# Patient Record
Sex: FEMALE | Race: WHITE | NOT HISPANIC OR LATINO | Employment: FULL TIME | URBAN - METROPOLITAN AREA
[De-identification: names, ages, dates, MRNs, and addresses within clinical notes are randomized per-mention and may not be internally consistent; named-entity substitution may affect disease eponyms.]

---

## 2017-06-13 DIAGNOSIS — Z30.011 ENCOUNTER FOR INITIAL PRESCRIPTION OF CONTRACEPTIVE PILLS: ICD-10-CM

## 2017-06-13 RX ORDER — LEVONORGESTREL AND ETHINYL ESTRADIOL 0.1-0.02MG
KIT ORAL
Qty: 84 TABLET | Refills: 0 | Status: SHIPPED | OUTPATIENT
Start: 2017-06-13 | End: 2017-09-08

## 2017-06-13 NOTE — TELEPHONE ENCOUNTER
levonorgestrel-ethinyl estradiol (AVIANE,ROYCE,LESSINA) 0.1-20 MG-MCG per tablet      Last Written Prescription Date: 6/23/16  Last Fill Quantity: 84, # refills: 3  Last Office Visit with [facility], [facility] or [facility] prescribing provider: 8/25/16       BP Readings from Last 3 Encounters:   08/25/16 121/76   06/23/16 116/76   07/14/15 94/64     Date of last Breast Exam:

## 2017-06-13 NOTE — TELEPHONE ENCOUNTER
Prescription approved per [facility] Refill Protocol.  Marielle García RN  [facility]

## 2017-09-08 DIAGNOSIS — Z30.011 ENCOUNTER FOR INITIAL PRESCRIPTION OF CONTRACEPTIVE PILLS: ICD-10-CM

## 2017-09-08 RX ORDER — LEVONORGESTREL AND ETHINYL ESTRADIOL 0.1-0.02MG
KIT ORAL
Qty: 84 TABLET | Refills: 0 | Status: SHIPPED | OUTPATIENT
Start: 2017-09-08 | End: 2017-12-03

## 2017-09-08 NOTE — TELEPHONE ENCOUNTER
Routing refill request to provider for review/approval because:  Patient needs to be seen because it has been more than 1 year since last office visit. Please advise. Lexi Shah RN-BSN

## 2017-09-08 NOTE — TELEPHONE ENCOUNTER
LUTERA 0.1-20 MG-MCG per tablet      Last Written Prescription Date: 6/13/17  Last Fill Quantity: 84, # refills: 0  Last Office Visit with [facility], [facility] or [facility] prescribing provider: 8/25/16       BP Readings from Last 3 Encounters:   08/25/16 121/76   06/23/16 116/76   07/14/15 94/64     Date of last Breast Exam:

## 2017-12-03 ENCOUNTER — TELEPHONE (OUTPATIENT)
Dept: FAMILY MEDICINE | Facility: CLINIC | Age: 27
End: 2017-12-03

## 2017-12-03 DIAGNOSIS — Z30.011 ENCOUNTER FOR INITIAL PRESCRIPTION OF CONTRACEPTIVE PILLS: ICD-10-CM

## 2017-12-05 RX ORDER — LEVONORGESTREL AND ETHINYL ESTRADIOL 0.1-0.02MG
KIT ORAL
Qty: 28 TABLET | Refills: 0 | Status: SHIPPED | OUTPATIENT
Start: 2017-12-05 | End: 2017-12-15

## 2017-12-05 NOTE — TELEPHONE ENCOUNTER
Message left on home number for patient to call back TC line.  NTBS for yearly check up with PCP.    Kalpana SKELTON

## 2017-12-05 NOTE — TELEPHONE ENCOUNTER
aviane oral contraceptive      Last Written Prescription Date: 9/8/17  Last Fill Quantity: 84,  # refills: 0   Last Office Visit with [facility] or [facility] prescribing provider: 8/25/16    Requested Prescriptions   Pending Prescriptions Disp Refills     AVIANE 0.1-20 MG-MCG per tablet [Pharmacy Med Name: AVIANE-28 TABLETS 28] 84 tablet 0     Sig: TAKE 1 TABLET BY MOUTH EVERY DAY    Contraceptives Protocol Failed    12/3/2017  9:54 AM       Failed - Recent or future visit with authorizing provider's specialty    Patient had office visit in the last year or has a visit in the next 30 days with authorizing provider.  See chart review.              Passed - Patient is not a current smoker if age is 35 or older       Passed - No active pregnancy on record       Passed - No positive pregnancy test in past 12 months        Medication is being filled for 1 time refill only due to:  Patient needs to be seen because it has been more than one year since last visit.     Encounter routed to team to reach out to patient to schedule.  Marielle García RN  [facility]

## 2017-12-06 ENCOUNTER — TELEPHONE (OUTPATIENT)
Dept: FAMILY MEDICINE | Facility: CLINIC | Age: 27
End: 2017-12-06

## 2017-12-06 NOTE — TELEPHONE ENCOUNTER
Agree with nursing assessment. Do not take extra pills and use back up throughout the month.   Sherry Grey PA-C

## 2017-12-06 NOTE — TELEPHONE ENCOUNTER
Routed to provider to advise, assume she would not take any extra pills but does she need to use alternate contraception for a month or anything due to disruption of schedule?    Marielle García RN  [facility]

## 2017-12-06 NOTE — TELEPHONE ENCOUNTER
Reason for Call:  Other call back    Detailed comments: Patient states that she was supposed to start her new pack of birth control on Sunday, but she had run out of refills so she didn't get it until Tuesday. Patient states that she took one pill yesterday evening when she got the medication, but she is looking for guidance if this was the right thing to do, or if she needs to take the pills she missed.    Phone Number Patient can be reached at: Home number on file [phone] (home)    Best Time: anytime    Can we leave a detailed message on this number? YES    Call taken on 12/6/2017 at 10:20 AM by David Lopez

## 2017-12-06 NOTE — TELEPHONE ENCOUNTER
Attempt # 1  Called patient at home number.  Was call answered?  Yes, relayed below message from provider to patient - patient had no further questions.    Mirela Brown RN  [facility]

## 2017-12-15 ENCOUNTER — OFFICE VISIT (OUTPATIENT)
Dept: FAMILY MEDICINE | Facility: CLINIC | Age: 27
End: 2017-12-15
Payer: COMMERCIAL

## 2017-12-15 VITALS
WEIGHT: 125.4 LBS | SYSTOLIC BLOOD PRESSURE: 117 MMHG | TEMPERATURE: 98.2 F | HEART RATE: 78 BPM | DIASTOLIC BLOOD PRESSURE: 83 MMHG | BODY MASS INDEX: 23.3 KG/M2

## 2017-12-15 DIAGNOSIS — Z30.011 ENCOUNTER FOR INITIAL PRESCRIPTION OF CONTRACEPTIVE PILLS: ICD-10-CM

## 2017-12-15 DIAGNOSIS — B00.1 HERPES LABIALIS: ICD-10-CM

## 2017-12-15 DIAGNOSIS — Z00.00 ROUTINE GENERAL MEDICAL EXAMINATION AT A HEALTH CARE FACILITY: Primary | ICD-10-CM

## 2017-12-15 DIAGNOSIS — Z23 NEED FOR PROPHYLACTIC VACCINATION AND INOCULATION AGAINST INFLUENZA: ICD-10-CM

## 2017-12-15 PROCEDURE — 99395 PREV VISIT EST AGE 18-39: CPT | Mod: 25 | Performed by: PHYSICIAN ASSISTANT

## 2017-12-15 PROCEDURE — 90686 IIV4 VACC NO PRSV 0.5 ML IM: CPT | Performed by: PHYSICIAN ASSISTANT

## 2017-12-15 PROCEDURE — 90471 IMMUNIZATION ADMIN: CPT | Performed by: PHYSICIAN ASSISTANT

## 2017-12-15 RX ORDER — LEVONORGESTREL/ETHIN.ESTRADIOL 0.1-0.02MG
1 TABLET ORAL DAILY
Qty: 84 TABLET | Refills: 3 | Status: SHIPPED | OUTPATIENT
Start: 2017-12-15 | End: 2018-05-01

## 2017-12-15 RX ORDER — VALACYCLOVIR HYDROCHLORIDE 1 G/1
2000 TABLET, FILM COATED ORAL 2 TIMES DAILY
Qty: 20 TABLET | Refills: 1 | Status: SHIPPED | OUTPATIENT
Start: 2017-12-15 | End: 2018-12-27

## 2017-12-15 NOTE — NURSING NOTE
"Chief Complaint   Patient presents with     Physical     Health Maintenance     Eye Exam and Influenza        Initial /83 (BP Location: Right arm, Patient Position: Chair, Cuff Size: Adult Regular)  Pulse 78  Temp 98.2  F (36.8  C) (Oral)  Wt 125 lb 6.4 oz (56.9 kg)  LMP 11/13/2017 (Approximate)  Breastfeeding? No  BMI 23.3 kg/m2 Estimated body mass index is 23.3 kg/(m^2) as calculated from the following:    Height as of 6/23/16: 5' 1.52\" (1.563 m).    Weight as of this encounter: 125 lb 6.4 oz (56.9 kg).  Medication Reconciliation: complete     DONNA Hooper MA      "

## 2017-12-15 NOTE — PROGRESS NOTES
SUBJECTIVE:   CC: Emily Carrillo is an 27 year old woman who presents for preventive health visit.     Physical   Annual:     Getting at least 3 servings of Calcium per day::  Yes    Bi-annual eye exam::  Yes    Dental care twice a year::  Yes    Sleep apnea or symptoms of sleep apnea::  None    Diet::  Regular (no restrictions)    Frequency of exercise::  4-5 days/week    Duration of exercise::  45-60 minutes    Taking medications regularly::  Yes    Additional concerns today::  No          Has annual eye exam.   Was off of OCP for three days while waiting for refill, but used backup form of protection.   LMP last month.     Gets cold sores often, would like a refill for valtrex.    Today's PHQ-2 Score:   PHQ-2 ( 1999 Pf[employer]) 12/15/2017   Q1: Little interest or pleasure in doing things 0   Q2: Feeling down, depressed or hopeless 0   PHQ-2 Score 0   Q1: Little interest or pleasure in doing things Not at all   Q2: Feeling down, depressed or hopeless Not at all   PHQ-2 Score 0       Abuse: Current or Past(Physical, Sexual or Emotional)- No  Do you feel safe in your environment - Yes    Social History   Substance Use Topics     Smoking status: Never Smoker     Smokeless tobacco: Never Used     Alcohol use Yes      Comment: 2 times  a month      Alcohol Use 12/15/2017   If you drink alcohol, do you typically have greater than 3 drinks per day OR greater than 7 drinks per week?   No     Alcohol use one time a week.   Reviewed orders with patient.  Reviewed health maintenance and updated orders accordingly - Yes    Mammogram not appropriate for this patient based on age.    Pertinent mammograms are reviewed under the imaging tab.  History of abnormal Pap smear: NO - age 21-29 PAP every 3 years recommended    Reviewed and updated as needed this visit by clinical staff  Tobacco  Allergies  Meds  Med Hx  Surg Hx  Fam Hx  Soc Hx      Reviewed and updated as needed this visit by Provider       Review of Systems  C:  NEGATIVE for fever, chills, change in weight  I: NEGATIVE for worrisome rashes, moles or lesions  E: NEGATIVE for vision changes or irritation  ENT: NEGATIVE for ear, mouth and throat problems  R: NEGATIVE for significant cough or SOB  B: NEGATIVE for masses, tenderness or discharge  CV: NEGATIVE for chest pain, palpitations or peripheral edema  GI: NEGATIVE for nausea, abdominal pain, heartburn, or change in bowel habits  : NEGATIVE for unusual urinary or vaginal symptoms. Periods are regular.  M: NEGATIVE for significant arthralgias or myalgia  N: NEGATIVE for weakness, dizziness or paresthesias  P: NEGATIVE for changes in mood or affect     OBJECTIVE:   /83 (BP Location: Right arm, Patient Position: Chair, Cuff Size: Adult Regular)  Pulse 78  Temp 98.2  F (36.8  C) (Oral)  Wt 125 lb 6.4 oz (56.9 kg)  LMP 11/13/2017 (Approximate)  Breastfeeding? No  BMI 23.3 kg/m2  Physical Exam  GENERAL: healthy, alert and no distress  EYES: Eyes grossly normal to inspection, PERRL and conjunctivae and sclerae normal  HENT: ear canals and TM's normal, nose and mouth without ulcers or lesions  NECK: no adenopathy, no asymmetry, masses, or scars and thyroid normal to palpation  RESP: lungs clear to auscultation - no rales, rhonchi or wheezes  BREAST: normal without masses, tenderness or nipple discharge and no palpable axillary masses or adenopathy  CV: regular rate and rhythm, normal S1 S2, no S3 or S4, no murmur, click or rub, no peripheral edema and peripheral pulses strong  ABDOMEN: soft, nontender, no hepatosplenomegaly, no masses and bowel sounds normal  MS: no gross musculoskeletal defects noted, no edema  SKIN: no suspicious lesions or rashes  NEURO: Normal strength and tone, mentation intact and speech normal  PSYCH: mentation appears normal, affect normal/bright    ASSESSMENT/PLAN:       ICD-10-CM    1. Routine general medical examination at a health care facility Z00.00    2. Need for prophylactic  "vaccination and inoculation against influenza Z23 FLU VAC, SPLIT VIRUS IM > 3 YO (QUADRIVALENT) [06396]     Vaccine Administration, Initial [11965]   3. Encounter for initial prescription of contraceptive pills Z30.011 levonorgestrel-ethinyl estradiol (AVIANE) 0.1-20 MG-MCG per tablet   4. Herpes labialis B00.1 valACYclovir (VALTREX) 1000 mg tablet   Emily is a healthy, well appearing female. Discussed possible migraine typehead aches with auras; told patient she can try Excedrin-Migraine as needed for headaches when severe. OCP working well and would like to continue it. Uses Valtrex as needed for herpes labialis, and it works well to decrease her symptoms.     COUNSELING:  Reviewed preventive health counseling, as reflected in patient instructions       Immunizations    Vaccinated for: Influenza           Contraception     reports that she has never smoked. She has never used smokeless tobacco.    Estimated body mass index is 23.3 kg/(m^2) as calculated from the following:    Height as of 6/23/16: 5' 1.52\" (1.563 m).    Weight as of this encounter: 125 lb 6.4 oz (56.9 kg).     Counseling Resources:  ATP IV Guidelines  Pooled Cohorts Equation Calculator  Breast Cancer Risk Calculator  FRAX Risk Assessment  ICSI Preventive Guidelines  Dietary Guidelines for Americans, 2010  USDA's MyPlate  ASA Prophylaxis  Lung CA Screening    TAMIKO Pyle PA-C  [facility] for HPI/ROS submitted by the patient on 12/15/2017   PHQ-2 Score: 0    Injectable Influenza Immunization Documentation    1.  Is the person to be vaccinated sick today?   No    2. Does the person to be vaccinated have an allergy to a component   of the vaccine?   No  Egg Allergy Algorithm Link    3. Has the person to be vaccinated ever had a serious reaction   to influenza vaccine in the past?   No    4. Has the person to be vaccinated ever had Guillain-Barré syndrome?   No    Form completed by DONNA Hooper, " MA

## 2017-12-15 NOTE — NURSING NOTE
VIS for Influenza given on same date of administration.  Staff signature/Title: DONNA Hooper MA    Prior to injection verified patient identity using patient's name and date of birth.  DONNA Hooper MA    Per orders of Sherry Grey PA-C, injection of Influenza given by Ary Hooper CMA. Patient instructed to remain in clinic for 15 minutes afterwards, and to report any adverse reaction to me immediately.

## 2017-12-15 NOTE — MR AVS SNAPSHOT
After Visit Summary   12/15/2017    Emily Carrillo    MRN: [id number]           Patient Information     Date Of Birth          1990        Visit Information        Provider Department      12/15/2017 10:20 AM Sherry Grey PA-C [facility]        Today's Diagnoses     Routine general medical examination at a health care facility    -  1    Need for prophylactic vaccination and inoculation against influenza        Encounter for initial prescription of contraceptive pills        Herpes labialis          Care Instructions      Preventive Health Recommendations  Female Ages 26 - 39  Yearly exam:   See your health care provider every year in order to    Review health changes.     Discuss preventive care.      Review your medicines if you your doctor has prescribed any.    Until age 30: Get a Pap test every three years (more often if you have had an abnormal result).    After age 30: Talk to your doctor about whether you should have a Pap test every 3 years or have a Pap test with HPV screening every 5 years.   You do not need a Pap test if your uterus was removed (hysterectomy) and you have not had cancer.  You should be tested each year for STDs (sexually transmitted diseases), if you're at risk.   Talk to your provider about how often to have your cholesterol checked.  If you are at risk for diabetes, you should have a diabetes test (fasting glucose).  Shots: Get a flu shot each year. Get a tetanus shot every 10 years.   Nutrition:     Eat at least 5 servings of fruits and vegetables each day.    Eat whole-grain bread, whole-wheat pasta and brown rice instead of white grains and rice.    Talk to your provider about Calcium and Vitamin D.     Lifestyle    Exercise at least 150 minutes a week (30 minutes a day, 5 days of the week). This will help you control your weight and prevent disease.    Limit alcohol to one drink per day.    No smoking.     Wear sunscreen to prevent skin  "cancer.    See your dentist every six months for an exam and cleaning.            Follow-ups after your visit        Who to contact     If you have questions or need follow up information about today's clinic visit or your schedule please contact [facility] directly at [phone].  Normal or non-critical lab and imaging results will be communicated to you by MyChart, letter or phone within 4 business days after the clinic has received the results. If you do not hear from us within 7 days, please contact the clinic through MyChart or phone. If you have a critical or abnormal lab result, we will notify you by phone as soon as possible.  Submit refill requests through [employer] or call your pharmacy and they will forward the refill request to us. Please allow 3 business days for your refill to be completed.          Additional Information About Your Visit        MyChart Information     [employer] lets you send messages to your doctor, view your test results, renew your prescriptions, schedule appointments and more. To sign up, go to [email or web address] . Click on \"Log in\" on the left side of the screen, which will take you to the Welcome page. Then click on \"Sign up Now\" on the right side of the page.     You will be asked to enter the access code listed below, as well as some personal information. Please follow the directions to create your username and password.     Your access code is: [email or web address]  Expires: 3/15/2018 10:53 AM     Your access code will  in 90 days. If you need help or a new code, please call your [facility] or [phone].        Care EveryWhere ID     This is your Care EveryWhere ID. This could be used by other organizations to access your [facility] medical records  [id number]        Your Vitals Were     Pulse Temperature Last Period Breastfeeding? BMI (Body Mass Index)       78 98.2  F (36.8  C) (Oral) 2017 (Approximate) No 23.3 kg/m2        Blood " Pressure from Last 3 Encounters:   12/15/17 117/83   08/25/16 121/76   06/23/16 116/76    Weight from Last 3 Encounters:   12/15/17 125 lb 6.4 oz (56.9 kg)   08/25/16 116 lb (52.6 kg)   06/23/16 117 lb (53.1 kg)              We Performed the Following     FLU VAC, SPLIT VIRUS IM > 3 YO (QUADRIVALENT) [39009]     Vaccine Administration, Initial [10782]          Today's Medication Changes          These changes are accurate as of: 12/15/17 10:53 AM.  If you have any questions, ask your nurse or doctor.               These medicines have changed or have updated prescriptions.        Dose/Directions    levonorgestrel-ethinyl estradiol 0.1-20 MG-MCG per tablet   Commonly known as:  AVIANE   This may have changed:  See the new instructions.   Used for:  Encounter for initial prescription of contraceptive pills   Changed by:  Sherry rGey PA-C        Dose:  1 tablet   Take 1 tablet by mouth daily   Quantity:  84 tablet   Refills:  3            Where to get your medicines      These medications were sent to [facility] [address] [place] [id number]     Phone:  [phone]     levonorgestrel-ethinyl estradiol 0.1-20 MG-MCG per tablet    valACYclovir 1000 mg tablet                Primary Care Provider Office Phone # Fax #    Sherry Grey PA-C [phone]       [address] [facility] [address]        Equal Access to Services     KAJAL HAGEN : Hadii aad ku hadasho Sodaniloali, waaxda luqadaha, qaybta kaalmada adeegyada, farhad salvador. So [facility] [phone].    ATENCIÓN: Si habla español, tiene a robertson disposición servicios gratuitos de asistencia lingüística. Kayla rodriguez [phone].    We comply with applicable federal civil rights laws and [place] laws. We do not discriminate on the basis of race, color, national origin, age, disability, sex, sexual orientation, or gender identity.             Thank you!     Thank you for choosing [facility]  for your care. Our goal is always to provide you with excellent care. Hearing back from our patients is one way we can continue to improve our services. Please take a few minutes to complete the written survey that you may receive in the mail after your visit with us. Thank you!             Your Updated Medication List - Protect others around you: Learn how to safely use, store and throw away your medicines at www.disposemymeds.org.          This list is accurate as of: 12/15/17 10:53 AM.  Always use your most recent med list.                   Brand Name Dispense Instructions for use Diagnosis    levonorgestrel-ethinyl estradiol 0.1-20 MG-MCG per tablet    AVIANE    84 tablet    Take 1 tablet by mouth daily    Encounter for initial prescription of contraceptive pills       valACYclovir 1000 mg tablet    VALTREX    20 tablet    Take 2 tablets (2,000 mg) by mouth 2 times daily For 1 day.    Herpes labialis

## 2018-05-01 ENCOUNTER — TELEPHONE (OUTPATIENT)
Dept: FAMILY MEDICINE | Facility: CLINIC | Age: 28
End: 2018-05-01

## 2018-05-01 DIAGNOSIS — Z30.011 ENCOUNTER FOR INITIAL PRESCRIPTION OF CONTRACEPTIVE PILLS: ICD-10-CM

## 2018-05-01 RX ORDER — LEVONORGESTREL/ETHIN.ESTRADIOL 0.1-0.02MG
1 TABLET ORAL DAILY
Qty: 84 TABLET | Refills: 2 | Status: SHIPPED | OUTPATIENT
Start: 2018-05-01 | End: 2018-12-19

## 2018-05-01 NOTE — TELEPHONE ENCOUNTER
Reason for Call:  Other prescription    Detailed comments: patient is requesting Rx for Silas/Lutera [facility] to clinic back in March but did not receive a response. Patient will ask pharmacy to resend information.    Phone Number Patient can be reached at: Home number on file [phone] (home)    Best Time: anytime    Can we leave a detailed message on this number? YES    Call taken on 5/1/2018 at 2:33 PM by Sierra Perez

## 2018-05-01 NOTE — TELEPHONE ENCOUNTER
Patient was last seen 12/15/17, annual follow up advised.    Mail order will not call to transfer Rx.    84 tabs with 3 refills was sent to [facility] 12/15/17.    I sent 84 with 2 refills to [facility] now to get her to/through needed follow up.    I called patient at [phone], no answer, left brief message advising Rx was re-sent to requested mail order pharmacy.    Marielle García RN  [facility]

## 2018-07-07 ENCOUNTER — NURSE TRIAGE (OUTPATIENT)
Dept: NURSING | Facility: CLINIC | Age: 28
End: 2018-07-07

## 2018-07-07 NOTE — TELEPHONE ENCOUNTER
"Patient developed sunburn 7/5/18, and this norming her face and around eyes are swollen.  Denies visual problems.  \"About three tiny blisters on nose.   Additional Information    Negative: Difficult to awaken or acting confused  (e.g., disoriented, slurred speech)    Negative: Passed out (i.e., lost consciousness, collapsed and was not responding)    Negative: Sounds like a life-threatening emergency to the triager    Negative: [1] Sunburn - like rash BUT [2] minimal or no sun exposure    Negative: Heat stroke, sunstroke or heat exhaustion suspected    Negative: Too weak to stand    Negative: Fever > 103 F (39.4 C)    Negative: [1] Blisters (2nd degree burn) AND [2] covers > 10% of body surface area    Negative: Patient sounds very sick or weak to the triager    Negative: [1] SEVERE sunburn pain (e.g., excruciating) AND [2] not improved after 2  hours of pain medicine    Negative: [1] SEVERE eye pain or blurred vision AND [2] follows sun exposure (welding or other significant light exposure)    Negative: [1] Fever AND [2] spreading redness or red streak from open area    Negative: [1] Blisters (2nd degree burn) AND [2] covers > 2% of body surface area (i.e., > 2 palms)    Negative: Blisters on the face    Negative: Blister larger than 1 inch (2.5 cm)    Negative: [1] Looks infected  (e.g., pus, red streaks, spreading redness) AND [2] no fever    Negative: [1] Sunburn following brief sun exposure AND [2] taking photosensitizing drug (e.g., tetracycline, doxycycline, elidel, protopic, griseofulvin, sulfa drugs)    Negative: Recurring episodes of itchy rash in sun-exposed areas    Mild sunburn (all triage questions negative)    Protocols used: SUNBURN-ADULT-      "

## 2018-12-19 DIAGNOSIS — Z30.011 ENCOUNTER FOR INITIAL PRESCRIPTION OF CONTRACEPTIVE PILLS: ICD-10-CM

## 2018-12-19 RX ORDER — LEVONORGESTREL/ETHIN.ESTRADIOL 0.1-0.02MG
TABLET ORAL
Qty: 84 TABLET | Refills: 0 | Status: SHIPPED | OUTPATIENT
Start: 2018-12-19 | End: 2018-12-31

## 2018-12-19 NOTE — TELEPHONE ENCOUNTER
Medication is being filled for 1 time refill only due to:  Patient needs to be seen because it has been more than one year since last visit.   Merary Meier RN

## 2018-12-19 NOTE — TELEPHONE ENCOUNTER
"Requested Prescriptions   Pending Prescriptions Disp Refills     levonorgestrel-ethinyl estradiol (AVIANE/ALESSE/LESSINA) 0.1-20 MG-MCG tablet [Pharmacy Med Name: L-RICHAR/ETH ES TABS 28'S 0.1/20] 84 tablet 2    Last Written Prescription Date:  5-1-18  Last Fill Quantity: 84,  # refills: 2   Last office visit: 12/15/2017 with prescribing provider:  12-15-17   Future Office Visit:     Sig: TAKE 1 TABLET DAILY    Contraceptives Protocol Failed - 12/19/2018  1:33 AM       Failed - Recent (12 mo) or future (30 days) visit within the authorizing provider's specialty    Patient had office visit in the last 12 months or has a visit in the next 30 days with authorizing provider or within the authorizing provider's specialty.  See \"Patient Info\" tab in inbasket, or \"Choose Columns\" in Meds & Orders section of the refill encounter.             Passed - Patient is not a current smoker if age is 35 or older       Passed - No active pregnancy on record       Passed - No positive pregnancy test in past 12 months          "

## 2018-12-26 DIAGNOSIS — B00.1 HERPES LABIALIS: ICD-10-CM

## 2018-12-26 NOTE — TELEPHONE ENCOUNTER
Attempt # 1  Called patient at home number.  Was call answered?  Yes, patient looked at bottle and spelled out valACYclovir for nurse.    Cued up.    Requested Prescriptions   Pending Prescriptions Disp Refills     valACYclovir (VALTREX) 1000 mg tablet 20 tablet 1     Sig: Take 2 tablets (2,000 mg) by mouth 2 times daily For 1 day.    There is no refill protocol information for this order        Last Written Prescription Date:  12/15/2017  Last Fill Quantity: 20,  # refills: 1   Last office visit: 12/15/2017 with prescribing provider:  el Grey Office Visit:  12/31/2018 8:40 am  Routing refill request to provider for review/approval because:  Drug not on the [facility] refill protocol         Mirela Brown RN  [facility]

## 2018-12-26 NOTE — TELEPHONE ENCOUNTER
Reason for call:  Medication   If this is a refill request, has the caller requested the refill from the pharmacy already? No  Will the patient be using a [facility]? No  Name of the pharmacy and phone number for the current request: [facility] [phone]    Name of the medication requested: patient does not know the name but prescription is for cold sore    Other request: no    Phone number to reach patient:  Home number on file [phone] (home)    Best Time:  Anytime     Can we leave a detailed message on this number?  YES

## 2018-12-27 RX ORDER — VALACYCLOVIR HYDROCHLORIDE 1 G/1
2000 TABLET, FILM COATED ORAL 2 TIMES DAILY
Qty: 20 TABLET | Refills: 1 | Status: ON HOLD | OUTPATIENT
Start: 2018-12-27 | End: 2020-07-18

## 2018-12-31 ENCOUNTER — OFFICE VISIT (OUTPATIENT)
Dept: FAMILY MEDICINE | Facility: CLINIC | Age: 28
End: 2018-12-31
Payer: COMMERCIAL

## 2018-12-31 VITALS
SYSTOLIC BLOOD PRESSURE: 115 MMHG | HEART RATE: 78 BPM | WEIGHT: 124 LBS | TEMPERATURE: 98.1 F | BODY MASS INDEX: 22.82 KG/M2 | DIASTOLIC BLOOD PRESSURE: 79 MMHG | HEIGHT: 62 IN | OXYGEN SATURATION: 97 %

## 2018-12-31 DIAGNOSIS — Z00.00 ROUTINE GENERAL MEDICAL EXAMINATION AT A HEALTH CARE FACILITY: Primary | ICD-10-CM

## 2018-12-31 DIAGNOSIS — Z30.011 ENCOUNTER FOR INITIAL PRESCRIPTION OF CONTRACEPTIVE PILLS: ICD-10-CM

## 2018-12-31 PROCEDURE — 99395 PREV VISIT EST AGE 18-39: CPT | Performed by: PHYSICIAN ASSISTANT

## 2018-12-31 RX ORDER — LEVONORGESTREL/ETHIN.ESTRADIOL 0.1-0.02MG
1 TABLET ORAL DAILY
Qty: 84 TABLET | Refills: 3 | Status: SHIPPED | OUTPATIENT
Start: 2018-12-31 | End: 2019-08-19

## 2018-12-31 ASSESSMENT — ENCOUNTER SYMPTOMS
DIARRHEA: 0
HEARTBURN: 0
EYE PAIN: 0
DIZZINESS: 0
MYALGIAS: 0
WEAKNESS: 0
ABDOMINAL PAIN: 0
HEMATURIA: 0
DYSURIA: 0
NERVOUS/ANXIOUS: 0
SORE THROAT: 0
HEMATOCHEZIA: 0
HEADACHES: 1
PALPITATIONS: 0
ARTHRALGIAS: 0
CONSTIPATION: 0
NAUSEA: 0
FREQUENCY: 0
CHILLS: 0
BREAST MASS: 0
JOINT SWELLING: 0
PARESTHESIAS: 0
SHORTNESS OF BREATH: 0
FEVER: 0
COUGH: 0

## 2018-12-31 ASSESSMENT — MIFFLIN-ST. JEOR: SCORE: 1242.77

## 2018-12-31 NOTE — PROGRESS NOTES
SUBJECTIVE:   CC: Emily Carrillo is an 28 year old woman who presents for preventive health visit.     Physical   Annual:     Getting at least 3 servings of Calcium per day:  Yes    Bi-annual eye exam:  Yes    Dental care twice a year:  Yes    Sleep apnea or symptoms of sleep apnea:  None    Diet:  Regular (no restrictions)    Frequency of exercise:  2-3 days/week    Duration of exercise:  45-60 minutes    Taking medications regularly:  Yes    Medication side effects:  None    Additional concerns today:  No    PHQ-2 Total Score: 0    Has had a headache since Wednesday. Positional, makes the pounding in head. She took 1 ibuprofen 1 time, but nothing since. Persistent, able to fall asleep, but does wake up with the headache. Pain is a 2-3/10, had been a 10/10 on the first day. Has some more congestion, had a cold sore.         Today's PHQ-2 Score:   PHQ-2 ( 1999 Pfizer) 12/31/2018   Q1: Little interest or pleasure in doing things 0   Q2: Feeling down, depressed or hopeless 0   PHQ-2 Score 0   Q1: Little interest or pleasure in doing things Not at all   Q2: Feeling down, depressed or hopeless Not at all   PHQ-2 Score 0       Abuse: Current or Past(Physical, Sexual or Emotional)- No  Do you feel safe in your environment? Yes    Social History     Tobacco Use     Smoking status: Never Smoker     Smokeless tobacco: Never Used   Substance Use Topics     Alcohol use: Yes     Comment: 2 times  a month      Alcohol Use 12/31/2018   If you drink alcohol do you typically have greater than 3 drinks per day OR greater than 7 drinks per week? No       Reviewed orders with patient.  Reviewed health maintenance and updated orders accordingly - Yes  Labs reviewed in EPIC    Mammogram not appropriate for this patient based on age.    Pertinent mammograms are reviewed under the imaging tab.  History of abnormal Pap smear: NO - age 21-29 PAP every 3 years recommended  PAP / HPV 6/23/2016   PAP NIL     Reviewed and updated as needed  "this visit by clinical staff  Tobacco  Allergies  Meds  Problems  Med Hx  Surg Hx  Fam Hx  Soc Hx          Reviewed and updated as needed this visit by Provider  Tobacco  Allergies  Meds  Problems  Med Hx  Surg Hx  Fam Hx            Review of Systems   Constitutional: Negative for chills and fever.   HENT: Negative for congestion, ear pain, hearing loss and sore throat.    Eyes: Negative for pain and visual disturbance.   Respiratory: Negative for cough and shortness of breath.    Cardiovascular: Negative for chest pain, palpitations and peripheral edema.   Gastrointestinal: Negative for abdominal pain, constipation, diarrhea, heartburn, hematochezia and nausea.   Breasts:  Negative for tenderness, breast mass and discharge.   Genitourinary: Negative for dysuria, frequency, genital sores, hematuria, pelvic pain, urgency, vaginal bleeding and vaginal discharge.   Musculoskeletal: Negative for arthralgias, joint swelling and myalgias.   Skin: Negative for rash.   Neurological: Positive for headaches. Negative for dizziness, weakness and paresthesias.   Psychiatric/Behavioral: Negative for mood changes. The patient is not nervous/anxious.         OBJECTIVE:   /79 (BP Location: Left arm, Patient Position: Chair, Cuff Size: Adult Large)   Pulse 78   Temp 98.1  F (36.7  C) (Oral)   Ht 1.562 m (5' 1.5\")   Wt 56.2 kg (124 lb)   LMP 12/26/2018   SpO2 97%   BMI 23.05 kg/m    Physical Exam  GENERAL: healthy, alert and no distress  EYES: Eyes grossly normal to inspection, PERRL and conjunctivae and sclerae normal  HENT: ear canals and TM's normal, nose and mouth without ulcers or lesions  NECK: no adenopathy, no asymmetry, masses, or scars and thyroid normal to palpation  RESP: lungs clear to auscultation - no rales, rhonchi or wheezes  BREAST: normal without masses, tenderness or nipple discharge and no palpable axillary masses or adenopathy  CV: regular rate and rhythm, normal S1 S2, no S3 or S4, no " "murmur, click or rub, no peripheral edema and peripheral pulses strong  ABDOMEN: soft, nontender, no hepatosplenomegaly, no masses and bowel sounds normal  MS: no gross musculoskeletal defects noted, no edema  SKIN: no suspicious lesions or rashes  NEURO: Normal strength and tone, mentation intact and speech normal  PSYCH: mentation appears normal, affect normal/bright    Diagnostic Test Results:  none     ASSESSMENT/PLAN:       ICD-10-CM    1. Routine general medical examination at a health care facility Z00.00    2. Encounter for initial prescription of contraceptive pills Z30.011 levonorgestrel-ethinyl estradiol (AVIANE/ALESSE/LESSINA) 0.1-20 MG-MCG tablet   Patient will return for pap smear in June. Refilled OCP.   return to clinic prn.     COUNSELING:  Reviewed preventive health counseling, as reflected in patient instructions       Contraception       Family planning       Safe sex practices/STD prevention    BP Readings from Last 1 Encounters:   12/31/18 115/79     Estimated body mass index is 23.05 kg/m  as calculated from the following:    Height as of this encounter: 1.562 m (5' 1.5\").    Weight as of this encounter: 56.2 kg (124 lb).           reports that  has never smoked. she has never used smokeless tobacco.      Counseling Resources:  ATP IV Guidelines  Pooled Cohorts Equation Calculator  Breast Cancer Risk Calculator  FRAX Risk Assessment  ICSI Preventive Guidelines  Dietary Guidelines for Americans, 2010  USDA's MyPlate  ASA Prophylaxis  Lung CA Screening    Sherry Grey PA-C  [facility]  "

## 2018-12-31 NOTE — PATIENT INSTRUCTIONS
Take 1000mg of Tylenol before bed tonight  Use Afrin for no longer than 3 days.   If headache is not improving let me know.     Preventive Health Recommendations  Female Ages 26 - 39  Yearly exam:   See your health care provider every year in order to    Review health changes.     Discuss preventive care.      Review your medicines if you your doctor has prescribed any.    Until age 30: Get a Pap test every three years (more often if you have had an abnormal result).    After age 30: Talk to your doctor about whether you should have a Pap test every 3 years or have a Pap test with HPV screening every 5 years.   You do not need a Pap test if your uterus was removed (hysterectomy) and you have not had cancer.  You should be tested each year for STDs (sexually transmitted diseases), if you're at risk.   Talk to your provider about how often to have your cholesterol checked.  If you are at risk for diabetes, you should have a diabetes test (fasting glucose).  Shots: Get a flu shot each year. Get a tetanus shot every 10 years.   Nutrition:     Eat at least 5 servings of fruits and vegetables each day.    Eat whole-grain bread, whole-wheat pasta and brown rice instead of white grains and rice.    Get adequate Calcium and Vitamin D.     Lifestyle    Exercise at least 150 minutes a week (30 minutes a day, 5 days of the week). This will help you control your weight and prevent disease.    Limit alcohol to one drink per day.    No smoking.     Wear sunscreen to prevent skin cancer.    See your dentist every six months for an exam and cleaning.

## 2019-03-13 DIAGNOSIS — Z30.011 ENCOUNTER FOR INITIAL PRESCRIPTION OF CONTRACEPTIVE PILLS: ICD-10-CM

## 2019-03-13 NOTE — TELEPHONE ENCOUNTER
"Requested Prescriptions   Pending Prescriptions Disp Refills     levonorgestrel-ethinyl estradiol (AVIANE/ALESSE/LESSINA) 0.1-20 MG-MCG tablet [Pharmacy Med Name: L-RICHAR/ETH ES TABS 28'S 0.1/20] 84 tablet 0    Last Written Prescription Date:  12-31-18  Last Fill Quantity: 84,  # refills: 3   Last office visit: 12/31/2018 with prescribing provider:  12-31-18   Future Office Visit:     Sig: TAKE 1 TABLET DAILY (DUE FOR OFFICE VISIT AND/OR LABS BEFORE FURTHER REFILLS)    Contraceptives Protocol Passed - 3/13/2019 12:54 AM       Passed - Patient is not a current smoker if age is 35 or older       Passed - Recent (12 mo) or future (30 days) visit within the authorizing provider's specialty    Patient had office visit in the last 12 months or has a visit in the next 30 days with authorizing provider or within the authorizing provider's specialty.  See \"Patient Info\" tab in inbasket, or \"Choose Columns\" in Meds & Orders section of the refill encounter.             Passed - Medication is active on med list       Passed - No active pregnancy on record       Passed - No positive pregnancy test in past 12 months          "

## 2019-03-14 RX ORDER — LEVONORGESTREL/ETHIN.ESTRADIOL 0.1-0.02MG
TABLET ORAL
Qty: 84 TABLET | Refills: 0 | Status: SHIPPED | OUTPATIENT
Start: 2019-03-14 | End: 2019-06-06

## 2019-03-14 NOTE — TELEPHONE ENCOUNTER
Prescription approved per [facility] Refill Protocol.  Janeen Landeros RN

## 2019-06-06 DIAGNOSIS — Z30.011 ENCOUNTER FOR INITIAL PRESCRIPTION OF CONTRACEPTIVE PILLS: ICD-10-CM

## 2019-06-07 RX ORDER — LEVONORGESTREL/ETHIN.ESTRADIOL 0.1-0.02MG
TABLET ORAL
Qty: 84 TABLET | Refills: 2 | Status: SHIPPED | OUTPATIENT
Start: 2019-06-07 | End: 2019-08-19

## 2019-06-07 NOTE — TELEPHONE ENCOUNTER
Prescription approved per [facility] Refill Protocol.  Merary Meier RN

## 2019-06-07 NOTE — TELEPHONE ENCOUNTER
"Requested Prescriptions   Pending Prescriptions Disp Refills     levonorgestrel-ethinyl estradiol (AVIANE/ALESSE/LESSINA) 0.1-20 MG-MCG tablet [Pharmacy Med Name: L-RICHAR/ETH ES TABS 28'S 0.1/20] 84 tablet 0     Sig: TAKE 1 TABLET DAILY (DUE FOR OFFICE VISIT AND/OR LABS BEFORE FURTHER REFILLS)   Last Written Prescription Date:  3-14-19  Last Fill Quantity: 84,  # refills: 0   Last office visit: 12/31/2018 with prescribing provider:  12-31-18   Future Office Visit:        Contraceptives Protocol Passed - 6/6/2019 11:14 PM        Passed - Patient is not a current smoker if age is 35 or older        Passed - Recent (12 mo) or future (30 days) visit within the authorizing provider's specialty     Patient had office visit in the last 12 months or has a visit in the next 30 days with authorizing provider or within the authorizing provider's specialty.  See \"Patient Info\" tab in inbasket, or \"Choose Columns\" in Meds & Orders section of the refill encounter.              Passed - Medication is active on med list        Passed - No active pregnancy on record        Passed - No positive pregnancy test in past 12 months          "

## 2019-08-19 ENCOUNTER — OFFICE VISIT (OUTPATIENT)
Dept: FAMILY MEDICINE | Facility: CLINIC | Age: 29
End: 2019-08-19
Payer: COMMERCIAL

## 2019-08-19 VITALS
TEMPERATURE: 98.3 F | HEART RATE: 73 BPM | WEIGHT: 129.6 LBS | BODY MASS INDEX: 23.85 KG/M2 | HEIGHT: 62 IN | SYSTOLIC BLOOD PRESSURE: 126 MMHG | DIASTOLIC BLOOD PRESSURE: 65 MMHG

## 2019-08-19 DIAGNOSIS — Z30.011 ENCOUNTER FOR INITIAL PRESCRIPTION OF CONTRACEPTIVE PILLS: ICD-10-CM

## 2019-08-19 DIAGNOSIS — N91.2 AMENORRHEA: ICD-10-CM

## 2019-08-19 DIAGNOSIS — Z12.4 SCREENING FOR MALIGNANT NEOPLASM OF CERVIX: Primary | ICD-10-CM

## 2019-08-19 PROCEDURE — 99213 OFFICE O/P EST LOW 20 MIN: CPT | Performed by: PHYSICIAN ASSISTANT

## 2019-08-19 PROCEDURE — 36415 COLL VENOUS BLD VENIPUNCTURE: CPT | Performed by: PHYSICIAN ASSISTANT

## 2019-08-19 PROCEDURE — G0145 SCR C/V CYTO,THINLAYER,RESCR: HCPCS | Performed by: PHYSICIAN ASSISTANT

## 2019-08-19 PROCEDURE — 84702 CHORIONIC GONADOTROPIN TEST: CPT | Performed by: PHYSICIAN ASSISTANT

## 2019-08-19 RX ORDER — LEVONORGESTREL/ETHIN.ESTRADIOL 0.1-0.02MG
TABLET ORAL
Qty: 84 TABLET | Refills: 3 | Status: SHIPPED | OUTPATIENT
Start: 2019-08-19 | End: 2019-11-26

## 2019-08-19 ASSESSMENT — MIFFLIN-ST. JEOR: SCORE: 1268.17

## 2019-08-19 NOTE — PROGRESS NOTES
"Subjective     Emily Carrillo is a 28 year old female who presents to clinic today for the following health issues:    HPI   Pt is here for pap smear.      Patient had her physical. Just here for pap smear.   No discharge or odors. Not interested in STD testing.   No plans for pregnancy. Has been off her OCP x 2 months as she missed a period and wasn't sure next steps. She has had negative pregnancy tests at home.       Patient Active Problem List   Diagnosis     CARDIOVASCULAR SCREENING; LDL GOAL LESS THAN 160     No past surgical history on file.    Social History     Tobacco Use     Smoking status: Never Smoker     Smokeless tobacco: Never Used   Substance Use Topics     Alcohol use: Yes     Comment: 2 times  a month      Family History   Problem Relation Age of Onset     Hypertension No family hx of      Thyroid Disease No family hx of      Glaucoma No family hx of      Macular Degeneration No family hx of      Cancer Maternal Grandfather      Diabetes Paternal Grandmother      Cerebrovascular Disease Paternal Grandmother            Reviewed and updated as needed this visit by Provider         Review of Systems   ROS COMP: Constitutional, HEENT, cardiovascular, pulmonary, gi and gu systems are negative, except as otherwise noted.      Objective    /65 (BP Location: Left arm, Patient Position: Chair, Cuff Size: Adult Regular)   Pulse 73   Temp 98.3  F (36.8  C) (Oral)   Ht 1.562 m (5' 1.5\")   Wt 58.8 kg (129 lb 9.6 oz)   Breastfeeding? No   BMI 24.09 kg/m    Body mass index is 24.09 kg/m .  Physical Exam   GENERAL: healthy, alert and no distress   (female): normal female external genitalia, normal urethral meatus, vaginal mucosa, normal cervix/adnexa/uterus without masses or discharge    Diagnostic Test Results:  none         Assessment & Plan       ICD-10-CM    1. Screening for malignant neoplasm of cervix Z12.4 Pap imaged thin layer screen reflex to HPV if ASCUS - recommend age 25 - 29   2. " Encounter for initial prescription of contraceptive pills Z30.011 levonorgestrel-ethinyl estradiol (AVIANE/ALESSE/LESSINA) 0.1-20 MG-MCG tablet   3. Amenorrhea N91.2 HCG Quantitative Pregnancy, Blood ([id number])       Will get HCG blood test. If negative she should restart OCP. If positive will discuss.         No follow-ups on file.    Sherry Grey PA-C  [facility]

## 2019-08-20 LAB — B-HCG SERPL-ACNC: <1 IU/L (ref 0–5)

## 2019-08-20 NOTE — RESULT ENCOUNTER NOTE
Emily,     Your pregnancy test was negative. You can go ahead and start your pill pack. Please use back up protection for the next 2 weeks.   Sherry Grey PA-C

## 2019-08-22 LAB
COPATH REPORT: NORMAL
PAP: NORMAL

## 2019-11-26 ENCOUNTER — PRENATAL OFFICE VISIT (OUTPATIENT)
Dept: NURSING | Facility: CLINIC | Age: 29
End: 2019-11-26
Payer: COMMERCIAL

## 2019-11-26 VITALS
WEIGHT: 127.9 LBS | HEART RATE: 82 BPM | HEIGHT: 62 IN | BODY MASS INDEX: 23.53 KG/M2 | SYSTOLIC BLOOD PRESSURE: 118 MMHG | RESPIRATION RATE: 20 BRPM | OXYGEN SATURATION: 97 % | TEMPERATURE: 98.5 F | DIASTOLIC BLOOD PRESSURE: 62 MMHG

## 2019-11-26 DIAGNOSIS — Z34.00 SUPERVISION OF NORMAL FIRST PREGNANCY: ICD-10-CM

## 2019-11-26 DIAGNOSIS — N91.2 AMENORRHEA: Primary | ICD-10-CM

## 2019-11-26 LAB
ALBUMIN UR-MCNC: NEGATIVE MG/DL
AMORPH CRY #/AREA URNS HPF: ABNORMAL /HPF
APPEARANCE UR: NORMAL
BACTERIA #/AREA URNS HPF: ABNORMAL /HPF
BILIRUB UR QL STRIP: NEGATIVE
COLOR UR AUTO: YELLOW
ERYTHROCYTE [DISTWIDTH] IN BLOOD BY AUTOMATED COUNT: 12.5 % (ref 10–15)
GLUCOSE UR STRIP-MCNC: NEGATIVE MG/DL
HCG UR QL: POSITIVE
HCT VFR BLD AUTO: 36.7 % (ref 35–47)
HGB BLD-MCNC: 12.6 G/DL (ref 11.7–15.7)
HGB UR QL STRIP: NEGATIVE
KETONES UR STRIP-MCNC: NEGATIVE MG/DL
LEUKOCYTE ESTERASE UR QL STRIP: NEGATIVE
MCH RBC QN AUTO: 30.2 PG (ref 26.5–33)
MCHC RBC AUTO-ENTMCNC: 34.3 G/DL (ref 31.5–36.5)
MCV RBC AUTO: 88 FL (ref 78–100)
NITRATE UR QL: NEGATIVE
NON-SQ EPI CELLS #/AREA URNS LPF: ABNORMAL /LPF
PH UR STRIP: 7 PH (ref 5–7)
PLATELET # BLD AUTO: 399 10E9/L (ref 150–450)
RBC # BLD AUTO: 4.17 10E12/L (ref 3.8–5.2)
RBC #/AREA URNS AUTO: ABNORMAL /HPF
SOURCE: NORMAL
SP GR UR STRIP: 1.01 (ref 1–1.03)
UROBILINOGEN UR STRIP-ACNC: 0.2 EU/DL (ref 0.2–1)
WBC # BLD AUTO: 19 10E9/L (ref 4–11)
WBC #/AREA URNS AUTO: ABNORMAL /HPF

## 2019-11-26 PROCEDURE — 81001 URINALYSIS AUTO W/SCOPE: CPT | Performed by: OBSTETRICS & GYNECOLOGY

## 2019-11-26 PROCEDURE — 86900 BLOOD TYPING SEROLOGIC ABO: CPT | Performed by: OBSTETRICS & GYNECOLOGY

## 2019-11-26 PROCEDURE — 86762 RUBELLA ANTIBODY: CPT | Performed by: OBSTETRICS & GYNECOLOGY

## 2019-11-26 PROCEDURE — 87340 HEPATITIS B SURFACE AG IA: CPT | Performed by: OBSTETRICS & GYNECOLOGY

## 2019-11-26 PROCEDURE — 85027 COMPLETE CBC AUTOMATED: CPT | Performed by: OBSTETRICS & GYNECOLOGY

## 2019-11-26 PROCEDURE — 90686 IIV4 VACC NO PRSV 0.5 ML IM: CPT

## 2019-11-26 PROCEDURE — 86901 BLOOD TYPING SEROLOGIC RH(D): CPT | Performed by: OBSTETRICS & GYNECOLOGY

## 2019-11-26 PROCEDURE — 87086 URINE CULTURE/COLONY COUNT: CPT | Performed by: OBSTETRICS & GYNECOLOGY

## 2019-11-26 PROCEDURE — 36415 COLL VENOUS BLD VENIPUNCTURE: CPT | Performed by: OBSTETRICS & GYNECOLOGY

## 2019-11-26 PROCEDURE — 99207 ZZC NO CHARGE NURSE ONLY: CPT

## 2019-11-26 PROCEDURE — 87389 HIV-1 AG W/HIV-1&-2 AB AG IA: CPT | Performed by: OBSTETRICS & GYNECOLOGY

## 2019-11-26 PROCEDURE — 86780 TREPONEMA PALLIDUM: CPT | Performed by: OBSTETRICS & GYNECOLOGY

## 2019-11-26 PROCEDURE — 86850 RBC ANTIBODY SCREEN: CPT | Performed by: OBSTETRICS & GYNECOLOGY

## 2019-11-26 PROCEDURE — 90471 IMMUNIZATION ADMIN: CPT

## 2019-11-26 PROCEDURE — 81025 URINE PREGNANCY TEST: CPT | Performed by: PHYSICIAN ASSISTANT

## 2019-11-26 ASSESSMENT — MIFFLIN-ST. JEOR: SCORE: 1255.46

## 2019-11-26 NOTE — Clinical Note
Emily Barron sees primary care at our clinic in [place] but now lives in [place] so did her intake with us but wants to do care and deliver in [facility].   Took some doing but I figured out where to schedule all that!   Labs pending.   Planned pregnancy but just spotting since stopped her OCP sometime before August so LMP unknown.   Early dating US ordered and scheduled.   She is aware her first OB visit date may need to change based on results of ultrasound.Thanks!Brigid García, RNM [facility]

## 2019-11-26 NOTE — NURSING NOTE
"Patient presents to clinic today for prenatal education. This is the patient's 1st pregnancy. She has had 0 miscarriage(s), 0 (s), 0 term birth(s) and 0  birth(s). She has 0 living child(alanna).   This was a planned pregnancy.  Reviewed answers to patient's Prenatal OB Questionnaire. Screening is positive for LMP unknown, she stopped oral contraceptive sometime before her last clinic visit in August.   She had a couple episodes of light spotting since then.   She did home pregnancy test as she was feeling mildly nauseated and \"different\". Early dating US ordered and scheduled as well.    Medical, surgical, family and ObGyn history updated as appropriate. Reviewed current medications and allergies. Patient is not yet taking prenatal vitamins.   Advised her to buy over the counter.    Discussed all of the options within [place] for her prenatal care including Dr. Mi and Dr. Nuñez at [facility], midwives at [facility] and [place] and OB/GYN-Dr. Berumen and Dr. Norwood. Next visit scheduled with Dr. Neetu El out of [facility]; patient now lives in [place] and works in [place]; [facility] would be closest to her for delivery.    Reviewed and discussed OB 1st Trimester Plans/Education  and also summarized in detail handouts and brochures included in OB Prenatal Folder that patient is able to keep and bring home with her.    Discussed all of the blood tests with pt who agrees to have all including HIV. Pt aware that results will be discussed at next office visit with MD unless abnl results are indicated and phone call will be made.    Will forward chart on to Prenatal Care Provider to review.    Marielle García RN  [facility]          "

## 2019-11-26 NOTE — PROGRESS NOTES
Prenatal OB Questionnaire  Past Medical History  Diabetes   No  Hypertension   No  Heart Disease, mitral valve prolapse, or rheumatic fever?   No  An autoimmune disorder such as Lupus or Rheumatoid Arthritis?   No  Kidney Disease or Urinary Tract Infection?   No  Epilepsy, seizures or spells?   No  Migraine headaches?   No  A stroke or loss of function or sensation?   No  Any other neurological problems?   No  Have you ever been treated for depression?  No  Are you having problems with crying spells or loss of self-esteem?   No  Have you ever required psychiatric care?   No  Have you ever hepatitis, liver disease or jaundice?   No  Have you ever been treated for blood clots in your veins, deep venous thrombosis, inflammation in the veins, thrombosis, phlebitis, pulmonary embolism or varicosities?   No  Have you had excessive bleeding after surgery or dental work?   No  Do you bleed more than other women after a cut or scratch?   No  Do you have a history of anemia?   No  Have you ever been treated for thyroid problems or taken thyroid medication?  No  Do you have any other endocrine problems?  No  Have you ever been in a major accident or suffered serious trauma?   No  Within the last year, has anyone hit slapped, kicked or otherwise hurt you?  No  In the last year, has anyone forced you to have sex when you didn't want to?  No  Have you ever had a blood transfusion?   No  Would you refuse a blood transfusion if a doctor judged it to be medically necessary?   No  If you answered yes, would you rather die than have a blood transfusion?   N/A  If you answered yes, is this for Mu-ism reasons?   N/A  Does anyone in your home smoke?   No  Do you use tobacco products?  No  Do you drink beer, wine, hard liquor?  Yes, 1 per month, none since pregnant  Do you use any of the following: marijuana, speed, cocaine, heroine, hallucinogens, or other drugs?  No  Is your blood type Rh negative?   No  Have you ever had abnormal  antibodies in your blood?   No  Have you ever had asthma?   No  Have you ever had tuberculosis?   No  Do you have any allergies to drugs or over-the-counter medications?   No    Allergies as of 11/26/2019:    Allergies as of 11/26/2019     (No Known Allergies)       Do you have any breast problems?   No  Have you ever ?   No  Have you had any gynecological surgical procedures such as cervical conization, a LEEP procedure, laser treatment, cryosurgery of the cervix, or a dilation and curettage, etc?  No  Have you had any other surgical procedures?  No  Have you been hospitalized for a nonsurgical reason excluding normal delivery?   No  Have you ever had any anesthetic complications?   No  Have you ever had an abnormal pap smear?   No  Do you have a history of abnormalities of the uterus?   No  Did it take you more than one year to become pregnant?   No  Have you ever been evaluated or treated for infertility?   No  Is there a history of medical problems in your family, which you feel might adversely affect your health or pregnancy?   No  Do you have any other problems we have not asked you about which you feel may be important to this pregnancy?  No    Symptoms since Last Menstrual Period  Do you have any of the following:    *abdominal pain  No  *blood in stool or urine  No  *chest pain  No  *shortness of breath  No  *coughing or vomiting up blood No  *heart racing or skipping beats  No  *nausea and vomiting  Yes, had a GI bug a couple weeks ago with vomiting and diarrhea; resolved, no emesis since.  Has had occasional mild nausea since then.  *pain with urination  No  *vaginal discharge or bleeding  No  Current medications are:  Current Outpatient Medications   Medication Sig Dispense Refill     valACYclovir (VALTREX) 1000 mg tablet Take 2 tablets (2,000 mg) by mouth 2 times daily For 1 day. 20 tablet 1       Genetic Screening  At the time of birth, will you be 35 years old or older?  No  Has the patient,  baby s father, or anyone in either family had:  Thalassemia (Italian, Greek, Mediterranean, or  background only) and an MCV result less than 80?  No  Neural tube defect such as meningomyelocele, spina bifida or anencephaly?  No  Congenital heart defect?  No  Down s syndrome?  Yes, 's aunt has Down's syndrome  Jamaal-Sach s disease (Restorationist, Cajun, Nigerian-Dixon)?  No  Sickle cell disease or trait (Pricila)?  No  Hemophilia or other inherited problems of blood coagulation? No  Muscular dystrophy?  No  Cystic Fibrosis?  No  Jamey s chorea?  No  Mental retardation/autism? No   If yes, was the person tested for fragile X?  N/A  Any other inherited genetic or chromosomal disorder?  No  Maternal metabolic disorder (e.g. insulin-dependent diabetes, PKU)? No  A child with birth defects not listed above?  No  Recurrent pregnancy loss or a stillbirth?  No  Has the patient had any medications/street drugs/alcohol since her last menstrual period? No  Does the patient or baby s father have any other genetic risks?  No  Infection History  Do you object to being tested for Hepatitis B? No  Do you object to being tested for HIV? No  Do you feel that you are at high risk for coming in contact with the AIDS virus?  No  Have you ever been treated for tuberculosis?  No  Have you ever received the BCG vaccine for tuberculosis?  No  Have you ever had a positive skin test for tuberculosis? No  Do you live with someone who has tuberculosis?  No  Have you ever been exposed to tuberculosis?  No  Do you have genital herpes?  No  Does your partner have genital herpes?  No  Have you had a rash or viral illness since your last period?  No  Have you ever had Gonorrhea, Chlamydia, Syphilis, venereal warts, trichomoniasis, pelvic inflammatory disease or any other sexually transmitted disease?  No  Do you know if you are a genital group B streptococcus carrier? No  You have not had chicken pox/varicella  Yes  Have you been vaccinated  against chicken pox?  Yes  Have you had any other infectious disease? No        Early ultrasound screening tool:    Does patient have irregular periods?  Yes, states does not know when her LMP was; she stopped oral contraceptive (used mainly to regulate period) sometime before her last clinic visit in August 2019 and never went back on them.   States she had some light spotting a couple times since stopping contraceptive.  Did patient use hormonal birth control in the three months prior to positive urine pregnancy test? No  Is the patient breastfeeding?  No  Is the patient 10 weeks or greater at time of education visit?  No    Early dating US scheduled with assistance of  for [facility].       Chart routed to OB provider to review, await labs.    Marielle García RN  [facility]

## 2019-11-27 ENCOUNTER — NURSE TRIAGE (OUTPATIENT)
Dept: NURSING | Facility: CLINIC | Age: 29
End: 2019-11-27

## 2019-11-27 LAB
ABO + RH BLD: NORMAL
ABO + RH BLD: NORMAL
BACTERIA SPEC CULT: NO GROWTH
BLD GP AB SCN SERPL QL: NORMAL
BLOOD BANK CMNT PATIENT-IMP: NORMAL
HBV SURFACE AG SERPL QL IA: NONREACTIVE
HIV 1+2 AB+HIV1 P24 AG SERPL QL IA: NONREACTIVE
SPECIMEN EXP DATE BLD: NORMAL
SPECIMEN SOURCE: NORMAL

## 2019-11-27 NOTE — TELEPHONE ENCOUNTER
Katie is calling and was told my chart would be updated.  FNA relayed that my chart would be updated by MD when labs are final.  Katie is requesting lab results.  FNA relayed results.

## 2019-11-29 LAB
RUBV IGG SERPL IA-ACNC: 9 IU/ML
T PALLIDUM AB SER QL: NONREACTIVE

## 2019-12-02 PROBLEM — O09.899 RUBELLA NON-IMMUNE STATUS, ANTEPARTUM: Status: ACTIVE | Noted: 2019-12-02

## 2019-12-02 PROBLEM — Z28.39 RUBELLA NON-IMMUNE STATUS, ANTEPARTUM: Status: ACTIVE | Noted: 2019-12-02

## 2019-12-02 PROBLEM — D72.829 LEUKOCYTOSIS: Status: ACTIVE | Noted: 2019-12-02

## 2019-12-03 ENCOUNTER — HOSPITAL ENCOUNTER (OUTPATIENT)
Dept: ULTRASOUND IMAGING | Facility: CLINIC | Age: 29
Discharge: HOME | End: 2019-12-03
Attending: OBSTETRICS & GYNECOLOGY | Admitting: OBSTETRICS & GYNECOLOGY
Payer: COMMERCIAL

## 2019-12-03 DIAGNOSIS — Z34.00 SUPERVISION OF NORMAL FIRST PREGNANCY: ICD-10-CM

## 2019-12-03 PROCEDURE — 76801 OB US < 14 WKS SINGLE FETUS: CPT

## 2020-01-07 ENCOUNTER — PRENATAL OFFICE VISIT (OUTPATIENT)
Dept: OBGYN | Facility: CLINIC | Age: 30
End: 2020-01-07
Payer: COMMERCIAL

## 2020-01-07 VITALS — BODY MASS INDEX: 25.28 KG/M2 | WEIGHT: 136 LBS | SYSTOLIC BLOOD PRESSURE: 112 MMHG | DIASTOLIC BLOOD PRESSURE: 64 MMHG

## 2020-01-07 DIAGNOSIS — Z34.01 ENCOUNTER FOR SUPERVISION OF NORMAL FIRST PREGNANCY IN FIRST TRIMESTER: ICD-10-CM

## 2020-01-07 PROCEDURE — 99207 ZZC FIRST OB VISIT: CPT | Performed by: OBSTETRICS & GYNECOLOGY

## 2020-01-07 PROCEDURE — 87491 CHLMYD TRACH DNA AMP PROBE: CPT | Performed by: OBSTETRICS & GYNECOLOGY

## 2020-01-07 RX ORDER — PRENATAL VIT/IRON FUM/FOLIC AC 27MG-0.8MG
1 TABLET ORAL DAILY
COMMUNITY

## 2020-01-07 NOTE — PROGRESS NOTES
Emily is a 29 year old  at 12w6d here for new ob visit.      Planned but a surprise because they got pregnant right away.  Uncertain LMP because she hadn't been tracking it.   Here today with FOB [place].  Her sister in law is also pregnant, about a month ahead of them.  She has been feeling good so far, some fatigue and increased appetite. No bleeding or cramping.     OB History    Para Term  AB Living   1 0 0 0 0 0   SAB TAB Ectopic Multiple Live Births   0 0 0 0 0      # Outcome Date GA Lbr Ubaldo/2nd Weight Sex Delivery Anes PTL Lv   1 Current                ROS: Ten point review of systems was reviewed and negative except the above.    Past Medical History:   Diagnosis Date     Elevated cholesterol      Fibula fracture     Lt.     CRUZ (headache)      No past surgical history on file.  Patient Active Problem List    Diagnosis Date Noted     Rubella non-immune status, antepartum 2019     Priority: Medium     Leukocytosis 2019     Priority: Medium     Supervision of normal first pregnancy 2019     Priority: Medium     CARDIOVASCULAR SCREENING; LDL GOAL LESS THAN 160 2013     Priority: Medium      No Known Allergies  Prenatal Vit-Fe Fumarate-FA (PRENATAL MULTIVITAMIN W/IRON) 27-0.8 MG tablet, Take 1 tablet by mouth daily  valACYclovir (VALTREX) 1000 mg tablet, Take 2 tablets (2,000 mg) by mouth 2 times daily For 1 day.    No current facility-administered medications on file prior to visit.       Physical Exam:   /64 (BP Location: Right arm, Cuff Size: Adult Regular)   Wt 61.7 kg (136 lb)   BMI 25.28 kg/m      Gen:  no acute distress, comfortable, smiling  HENT: No scleral injection or icterus  CV: Regular rate and rhythm, no m/g/r  Resp: Normal work of breathing, no cough  GI: Abdomen soft, non-tender. No masses, organomegaly  Skin: No suspicious lesions or rashes  Psychiatric: mentation appears normal and affect bright    FHT 160s on doptones    A/P 29 year old   at 12w6d here for NOB visit.  - Discussed physician coverage, tertiary support, diet, exercise, weight gain, schedule of visits, routine and indicated ultrasounds, and childbirth education.   - Options for  testing for chromosomal and birth defects were discussed with the patient including nuchal lucency/blood marker testing in the first trimester and quad screening and/or Level 2 ultrasound in the second trimester. We discussed that these are screening tests and not diagnostic tests and that false positives and negatives are a distinct possibility. We discussed that follow up diagnostic testing would include chorionic villus sampling or amniocentesis depending on gestational age.  - NOB labs reviewed: RNI  - recommend PNV  - gc/chlam from urine today  - s/p flu shot earlier this season  - discussed NIPT and pre-parent screen, they are undecided but will call if they want to schedule    RTC 4 weeks    Neetu El MD, MPH  [facility]

## 2020-01-07 NOTE — NURSING NOTE
"Chief Complaint   Patient presents with     Prenatal Care     NPN 12w 6d       Initial /64 (BP Location: Right arm, Cuff Size: Adult Regular)   Wt 61.7 kg (136 lb)   BMI 25.28 kg/m   Estimated body mass index is 25.28 kg/m  as calculated from the following:    Height as of 19: 1.562 m (5' 1.5\").    Weight as of this encounter: 61.7 kg (136 lb).  BP completed using cuff size: regular    Questioned patient about current smoking habits.  Pt. has never smoked.          The following HM Due: NONE    Daniela Mazariegos CMA           "

## 2020-02-20 DIAGNOSIS — Z34.90 SUPERVISION OF NORMAL PREGNANCY: Primary | ICD-10-CM

## 2020-02-20 DIAGNOSIS — Z34.90 SUPERVISION OF NORMAL PREGNANCY: ICD-10-CM

## 2020-02-24 ENCOUNTER — TELEPHONE (OUTPATIENT)
Dept: OBGYN | Facility: CLINIC | Age: 30
End: 2020-02-24

## 2020-02-24 ENCOUNTER — HEALTH MAINTENANCE LETTER (OUTPATIENT)
Age: 30
End: 2020-02-24

## 2020-02-24 ENCOUNTER — PRENATAL OFFICE VISIT (OUTPATIENT)
Dept: OBGYN | Facility: CLINIC | Age: 30
End: 2020-02-24
Payer: COMMERCIAL

## 2020-02-24 ENCOUNTER — TRANSCRIBE ORDERS (OUTPATIENT)
Dept: MATERNAL FETAL MEDICINE | Facility: CLINIC | Age: 30
End: 2020-02-24

## 2020-02-24 VITALS
DIASTOLIC BLOOD PRESSURE: 68 MMHG | HEIGHT: 62 IN | WEIGHT: 141.2 LBS | SYSTOLIC BLOOD PRESSURE: 102 MMHG | BODY MASS INDEX: 25.98 KG/M2

## 2020-02-24 DIAGNOSIS — O26.90 PREGNANCY RELATED CONDITION: Primary | ICD-10-CM

## 2020-02-24 DIAGNOSIS — O28.3 ECHOGENIC INTRACARDIAC FOCUS OF FETUS ON PRENATAL ULTRASOUND: Primary | ICD-10-CM

## 2020-02-24 DIAGNOSIS — Z34.00 SUPERVISION OF NORMAL FIRST PREGNANCY: ICD-10-CM

## 2020-02-24 DIAGNOSIS — Z34.02 ENCOUNTER FOR SUPERVISION OF NORMAL FIRST PREGNANCY IN SECOND TRIMESTER: ICD-10-CM

## 2020-02-24 PROCEDURE — 99000 SPECIMEN HANDLING OFFICE-LAB: CPT | Performed by: FAMILY MEDICINE

## 2020-02-24 PROCEDURE — 99207 ZZC PRENATAL VISIT: CPT | Performed by: FAMILY MEDICINE

## 2020-02-24 PROCEDURE — 36415 COLL VENOUS BLD VENIPUNCTURE: CPT | Performed by: FAMILY MEDICINE

## 2020-02-24 PROCEDURE — 81511 FTL CGEN ABNOR FOUR ANAL: CPT | Mod: 90 | Performed by: FAMILY MEDICINE

## 2020-02-24 ASSESSMENT — MIFFLIN-ST. JEOR: SCORE: 1315.79

## 2020-02-24 NOTE — PROGRESS NOTES
"CC: Here for routine prenatal visit   29 year old y/o  @ 19w5d with Estimated Date of Delivery: Jul 15, 2020     /68   Ht 1.562 m (5' 1.5\")   Wt 64 kg (141 lb 3.2 oz)   BMI 26.25 kg/m    See OB flowsheet  + fetal movement, no contractions, no bleeding, no loss of fluid   Discussed monitoring fetal movement     This document serves as a record of the services and decisions personally performed and made by Valentina Concepcion DO. It was created on her behalf by Nuria Castellano, a trained medical scribe. The creation of this document is based on the provider's statements to the medical scribe.  Nuria Castellano 2:39 PM 2020    1) concerns: none today   2) Routine: RNI, flu given, girl, quad today   3) Risk factors:   A: R ventriclar EIF: referred to MFM   4) Problem list   Patient Active Problem List   Diagnosis     CARDIOVASCULAR SCREENING; LDL GOAL LESS THAN 160     Supervision of normal first pregnancy     Rubella non-immune status, antepartum     Leukocytosis     5) Return: in four weeks     The information in this document, created by the medical scribe for me, accurately reflects the services I personally performed and the decisions made by me. I have reviewed and approved this document for accuracy prior to leaving the patient care area.  2020 2:51 PM    Jamey    "

## 2020-02-24 NOTE — PATIENT INSTRUCTIONS
"Return 4 weeks  For innatal fetal dna test:  Schedule nurse appt [place] location   [phone]      Return to clinic:  every 4 weeks till 28 weeks, then every 2 weeks till 36 weeks, then weekly till delivery      Phone numbers [place]:  Day/ night [phone] ask for ob triage  Emergency:  Call labor and delivery:  [phone]    What should I call about??    Contraction every 5 minutes for 1 hour 1 minute long (511), bleeding, loss of fluid, headache that doesn't resolve with tylenol, and decreased fetal movement     Start kick counts @ 26-28 weeks   There is an alejandra for this!  It is called \"count the kicks\"  Keep track of movement and discover your normal baby movement pattern   guideline is listed below  Please call if you do not feel the baby move!  We will have you come in for fetal heart rate monitoring:   Perception of at least 10 FMs during 12 hours of normal maternal activity   Perception of least 10 FMs over two hours when the mother is at rest and focused on [facility] Address   [address], [place] [address] [phone]    Dr. Valentina Concepcion, DO    OB/GYN   [facility]    Echogenic intracardiac foci are bright spots in the baby's heart. They are seen in 3-5% of normal babies. However, when they are found, it is important to do a more detailed (level II) ultrasound to be sure that all other anatomy is normal. If all other anatomy is normal, then an echogenic focus is NOT associated with an increased risk of chromosome problems or any heart problems in babies                                                        "

## 2020-02-24 NOTE — TELEPHONE ENCOUNTER
Referral done to [facility] for R ventriclar EIF noted on office U/S.  Please notify the pt and assist her in making the appt

## 2020-02-24 NOTE — NURSING NOTE
"19w5d    Chief Complaint   Patient presents with     Prenatal Care     has MFM referral--discuss US from 2020       Initial /68   Ht 1.562 m (5' 1.5\")   Wt 64 kg (141 lb 3.2 oz)   BMI 26.25 kg/m   Estimated body mass index is 26.25 kg/m  as calculated from the following:    Height as of this encounter: 1.562 m (5' 1.5\").    Weight as of this encounter: 64 kg (141 lb 3.2 oz).  BP completed using cuff size: regular    Questioned patient about current smoking habits.  Pt. has never smoked.          The following HM Due: NONE    "

## 2020-02-24 NOTE — TELEPHONE ENCOUNTER
Left message to call back on voicemail on cell phone.  DANN Stern RN      (Echogenic intracardiac foci are bright spots in the baby's heart. They are seen in 3-5% of normal babies. However, when they are found, it is important to do a more detailed (level II) ultrasound to be sure that all other anatomy is normal. If all other anatomy is normal, then an echogenic focus is NOT associated with an increased risk of chromosome problems or any heart problems in babies)

## 2020-02-26 LAB
# FETUSES US: NORMAL
# FETUSES: NORMAL
AFP ADJ MOM AMN: 1.48
AFP SERPL-MCNC: 84 NG/ML
AGE - REPORTED: 28.9 YR
CURRENT SMOKER: NORMAL
CURRENT SMOKER: NORMAL
DIABETES STATUS PATIENT: NORMAL
FAMILY MEMBER DISEASES HX: NO
FAMILY MEMBER DISEASES HX: NORMAL
GA METHOD: NORMAL
GA METHOD: NORMAL
GA: NORMAL WK
HCG MOM SERPL: 1.18
HCG SERPL-ACNC: NORMAL IU/L
HX OF HEREDITARY DISORDERS: NORMAL
IDDM PATIENT QL: NORMAL
INHIBIN A MOM SERPL: 1.05
INHIBIN A SERPL-MCNC: 185 PG/ML
INTEGRATED SCN PATIENT-IMP: NORMAL
IVF PREGNANCY: NORMAL
LMP START DATE: NORMAL
MONOCHORIONIC TWINS: NORMAL
PATHOLOGY STUDY: NORMAL
PREV FETUS DEFECT: NORMAL
SERVICE CMNT-IMP: NORMAL
SPECIMEN DRAWN SERPL: NORMAL
U ESTRIOL MOM SERPL: 1.1
U ESTRIOL SERPL-MCNC: 2.55 NG/ML
VALPROIC/CARBAMAZEPINE STATUS: NORMAL
WEIGHT UNITS: NORMAL

## 2020-03-03 ENCOUNTER — PRE VISIT (OUTPATIENT)
Dept: MATERNAL FETAL MEDICINE | Facility: CLINIC | Age: 30
End: 2020-03-03

## 2020-03-05 ENCOUNTER — OFFICE VISIT (OUTPATIENT)
Dept: MATERNAL FETAL MEDICINE | Facility: CLINIC | Age: 30
End: 2020-03-05
Attending: OBSTETRICS & GYNECOLOGY
Payer: COMMERCIAL

## 2020-03-05 ENCOUNTER — HOSPITAL ENCOUNTER (OUTPATIENT)
Dept: ULTRASOUND IMAGING | Facility: CLINIC | Age: 30
End: 2020-03-05
Attending: OBSTETRICS & GYNECOLOGY
Payer: COMMERCIAL

## 2020-03-05 DIAGNOSIS — O26.90 PREGNANCY RELATED CONDITION: ICD-10-CM

## 2020-03-05 DIAGNOSIS — Z03.73 SUSPECTED FETAL ANOMALY NOT FOUND: Primary | ICD-10-CM

## 2020-03-05 PROCEDURE — 76811 OB US DETAILED SNGL FETUS: CPT

## 2020-03-05 NOTE — PROGRESS NOTES
"Please see \"Imaging\" tab under Chart Review for full details.    Velma Mascorro MD  Maternal Fetal Medicine    "

## 2020-04-14 ENCOUNTER — VIRTUAL VISIT (OUTPATIENT)
Dept: OBGYN | Facility: CLINIC | Age: 30
End: 2020-04-14
Payer: COMMERCIAL

## 2020-04-14 DIAGNOSIS — Z34.02 ENCOUNTER FOR SUPERVISION OF NORMAL FIRST PREGNANCY IN SECOND TRIMESTER: Primary | ICD-10-CM

## 2020-04-14 PROCEDURE — 99207 ZZC PRENATAL VISIT: CPT | Performed by: OBSTETRICS & GYNECOLOGY

## 2020-04-14 NOTE — PROGRESS NOTES
"Emily Linares is a 29 year old female who is being evaluated via a billable telephone visit.      The patient has been notified of following:     \"This telephone visit will be conducted via a call between you and your physician/provider. We have found that certain health care needs can be provided without the need for a physical exam.  This service lets us provide the care you need with a short phone conversation.  If a prescription is necessary we can send it directly to your pharmacy.  If lab work is needed we can place an order for that and you can then stop by our lab to have the test done at a later time.    Telephone visits are billed at different rates depending on your insurance coverage. During this emergency period, for some insurers they may be billed the same as an in-person visit.  Please reach out to your insurance provider with any questions.    If during the course of the call the physician/provider feels a telephone visit is not appropriate, you will not be charged for this service.\"    Patient has given verbal consent for Telephone visit?  Yes    How would you like to obtain your AVS? MyChart    IUP at 26w6d,    No concerns today.  BH cxtns mild, occurring 2-4x daily.   Mild heartburn, not requiring any intervention.  GCT/CBC in 2 days. Plan to obtain doptones at that time if nurse visit.   OV in 3w then phone visit for the following appointment.   Reviewed ptl.   Aysha Montilla MD     "

## 2020-04-16 ENCOUNTER — ALLIED HEALTH/NURSE VISIT (OUTPATIENT)
Dept: NURSING | Facility: CLINIC | Age: 30
End: 2020-04-16
Payer: COMMERCIAL

## 2020-04-16 VITALS — WEIGHT: 147 LBS | BODY MASS INDEX: 27.33 KG/M2 | DIASTOLIC BLOOD PRESSURE: 76 MMHG | SYSTOLIC BLOOD PRESSURE: 112 MMHG

## 2020-04-16 DIAGNOSIS — Z34.00 SUPERVISION OF NORMAL FIRST PREGNANCY: Primary | ICD-10-CM

## 2020-04-16 LAB
ERYTHROCYTE [DISTWIDTH] IN BLOOD BY AUTOMATED COUNT: 13.1 % (ref 10–15)
HCT VFR BLD AUTO: 37.1 % (ref 35–47)
HGB BLD-MCNC: 12.3 G/DL (ref 11.7–15.7)
MCH RBC QN AUTO: 30.4 PG (ref 26.5–33)
MCHC RBC AUTO-ENTMCNC: 33.2 G/DL (ref 31.5–36.5)
MCV RBC AUTO: 92 FL (ref 78–100)
PLATELET # BLD AUTO: 213 10E9/L (ref 150–450)
RBC # BLD AUTO: 4.05 10E12/L (ref 3.8–5.2)
WBC # BLD AUTO: 19 10E9/L (ref 4–11)

## 2020-04-16 PROCEDURE — 36415 COLL VENOUS BLD VENIPUNCTURE: CPT | Performed by: OBSTETRICS & GYNECOLOGY

## 2020-04-16 PROCEDURE — 99207 ZZC NO CHARGE NURSE ONLY: CPT

## 2020-04-16 PROCEDURE — 86780 TREPONEMA PALLIDUM: CPT | Performed by: OBSTETRICS & GYNECOLOGY

## 2020-04-16 PROCEDURE — 82950 GLUCOSE TEST: CPT | Performed by: OBSTETRICS & GYNECOLOGY

## 2020-04-16 PROCEDURE — 85027 COMPLETE CBC AUTOMATED: CPT | Performed by: OBSTETRICS & GYNECOLOGY

## 2020-04-16 NOTE — NURSING NOTE
"Chief Complaint   Patient presents with     Allied Health Visit       Initial /76   Wt 66.7 kg (147 lb)   Breastfeeding No   BMI 27.33 kg/m   Estimated body mass index is 27.33 kg/m  as calculated from the following:    Height as of 20: 1.562 m (5' 1.5\").    Weight as of this encounter: 66.7 kg (147 lb).  BP completed using cuff size: regular    Questioned patient about current smoking habits.  Pt. has never smoked.          Fetal heart tones done and in 140's x 1 minute.    Angelica Centeno LPN               "

## 2020-04-17 LAB
GLUCOSE 1H P 50 G GLC PO SERPL-MCNC: 142 MG/DL (ref 60–129)
T PALLIDUM AB SER QL: NONREACTIVE

## 2020-04-21 DIAGNOSIS — Z34.00 SUPERVISION OF NORMAL FIRST PREGNANCY: ICD-10-CM

## 2020-04-21 PROCEDURE — 82951 GLUCOSE TOLERANCE TEST (GTT): CPT | Performed by: OBSTETRICS & GYNECOLOGY

## 2020-04-21 PROCEDURE — 82952 GTT-ADDED SAMPLES: CPT | Performed by: OBSTETRICS & GYNECOLOGY

## 2020-04-21 PROCEDURE — 36415 COLL VENOUS BLD VENIPUNCTURE: CPT | Performed by: OBSTETRICS & GYNECOLOGY

## 2020-04-22 LAB
GLUCOSE 1H P 100 G GLC PO SERPL-MCNC: 152 MG/DL (ref 60–179)
GLUCOSE 2H P 100 G GLC PO SERPL-MCNC: 129 MG/DL (ref 60–154)
GLUCOSE 3H P 100 G GLC PO SERPL-MCNC: 113 MG/DL (ref 60–139)
GLUCOSE P FAST SERPL-MCNC: 87 MG/DL (ref 60–94)

## 2020-05-04 ENCOUNTER — PRENATAL OFFICE VISIT (OUTPATIENT)
Dept: OBGYN | Facility: CLINIC | Age: 30
End: 2020-05-04
Payer: COMMERCIAL

## 2020-05-04 VITALS — SYSTOLIC BLOOD PRESSURE: 110 MMHG | DIASTOLIC BLOOD PRESSURE: 60 MMHG | WEIGHT: 149 LBS | BODY MASS INDEX: 27.7 KG/M2

## 2020-05-04 DIAGNOSIS — Z23 ENCOUNTER FOR IMMUNIZATION: ICD-10-CM

## 2020-05-04 DIAGNOSIS — Z34.00 SUPERVISION OF NORMAL FIRST PREGNANCY, ANTEPARTUM: Primary | ICD-10-CM

## 2020-05-04 PROCEDURE — 90471 IMMUNIZATION ADMIN: CPT | Performed by: OBSTETRICS & GYNECOLOGY

## 2020-05-04 PROCEDURE — 99207 ZZC PRENATAL VISIT: CPT | Performed by: OBSTETRICS & GYNECOLOGY

## 2020-05-04 PROCEDURE — 90715 TDAP VACCINE 7 YRS/> IM: CPT | Performed by: OBSTETRICS & GYNECOLOGY

## 2020-05-04 NOTE — PROGRESS NOTES
IUP at 29w5d,    No concerns today. Good FM, rare BH contractions.   Reviewed PTL signs.   Reviewed mask use.   TV in 2-3w.   Aysha Montilla MD

## 2020-05-04 NOTE — NURSING NOTE
"Chief Complaint   Patient presents with     Prenatal Care     29 5/7 weeks       Initial /60   Wt 67.6 kg (149 lb)   BMI 27.70 kg/m   Estimated body mass index is 27.7 kg/m  as calculated from the following:    Height as of 20: 1.562 m (5' 1.5\").    Weight as of this encounter: 67.6 kg (149 lb).  BP completed using cuff size: regular    Questioned patient about current smoking habits.  Pt. has never smoked.          The following HM Due: NONE    +fetal movement  -swelling  -headaches    Marcy Lima, SHANNA    "

## 2020-06-01 ENCOUNTER — VIRTUAL VISIT (OUTPATIENT)
Dept: OBGYN | Facility: CLINIC | Age: 30
End: 2020-06-01
Payer: COMMERCIAL

## 2020-06-01 DIAGNOSIS — Z34.00 SUPERVISION OF NORMAL FIRST PREGNANCY, ANTEPARTUM: Primary | ICD-10-CM

## 2020-06-01 PROCEDURE — 99207 ZZC PRENATAL VISIT: CPT | Mod: TEL | Performed by: OBSTETRICS & GYNECOLOGY

## 2020-06-01 NOTE — PROGRESS NOTES
"Emily Linares is a 29 year old female who is being evaluated via a billable telephone visit.      The patient has been notified of following:     \"This telephone visit will be conducted via a call between you and your physician/provider. We have found that certain health care needs can be provided without the need for a physical exam.  This service lets us provide the care you need with a short phone conversation.  If a prescription is necessary we can send it directly to your pharmacy.  If lab work is needed we can place an order for that and you can then stop by our lab to have the test done at a later time.    Telephone visits are billed at different rates depending on your insurance coverage. During this emergency period, for some insurers they may be billed the same as an in-person visit.  Please reach out to your insurance provider with any questions.    If during the course of the call the physician/provider feels a telephone visit is not appropriate, you will not be charged for this service.\"    Patient has given verbal consent for Telephone visit?  Yes    What phone number would you like to be contacted at? [phone]    How would you like to obtain your AVS? MyChart    +fetal movement  -swelling  Next office visit 6/15  Marcy Lima, SHANNA    IUP at 33w5d,    +FM, BH throughout the day, very occasional  Level II within normal limits, no EIF identified.  Reviewed PTL.   OV 2w. GBS at that time.     Aysha Montilla MD       "

## 2020-06-01 NOTE — PATIENT INSTRUCTIONS
Phone numbers [place]:  Day/ night [phone] ask for OB triage  Emergency:  Call labor and delivery:  [phone]     Call right away with any of the following concerns:    1.   Regular, painful contractions every 10 minutes that make it difficult to walk or talk for over approximately one hour's time.    2.   Vaginal bleeding more than just a spot or 2 on the toilet paper.    3.   Leaking fluid of any amount, or suspicion of ruptured membranes    4.   Decreased movement of your baby.      Continue your prenatal vitamins daily.    Avoid lying flat on your back for a prolonged period.    Consume 2-3 servings of fish or seafood weekly for fetal brain development. Avoid shark, tilefish, swordfish, and albacore tune as these contain very charli levels of mercury. No raw seafood in pregnancy.    Be sure you are consuming 1000mg of calcium and 1000 IU of vitamin D daily.    Unless instructed otherwise, try to fit in 30 minutes of moderate exercise daily.    Please call with any additional concerns that your have, and continue your prenatal visits weekly.    You may complete your hospital pre-registration online at [email or web address].

## 2020-06-15 ENCOUNTER — PRENATAL OFFICE VISIT (OUTPATIENT)
Dept: OBGYN | Facility: CLINIC | Age: 30
End: 2020-06-15
Payer: COMMERCIAL

## 2020-06-15 VITALS — SYSTOLIC BLOOD PRESSURE: 110 MMHG | DIASTOLIC BLOOD PRESSURE: 70 MMHG | WEIGHT: 152 LBS | BODY MASS INDEX: 28.26 KG/M2

## 2020-06-15 DIAGNOSIS — Z34.00 SUPERVISION OF NORMAL FIRST PREGNANCY, ANTEPARTUM: Primary | ICD-10-CM

## 2020-06-15 PROCEDURE — 99207 ZZC PRENATAL VISIT: CPT | Performed by: OBSTETRICS & GYNECOLOGY

## 2020-06-15 RX ORDER — BREAST PUMP
1 EACH MISCELLANEOUS ONCE
Qty: 1 EACH | Refills: 0 | Status: SHIPPED | OUTPATIENT
Start: 2020-06-15 | End: 2020-06-15

## 2020-06-15 NOTE — NURSING NOTE
"Chief Complaint   Patient presents with     Prenatal Care     35 5/7 weeks       Initial /70   Wt 68.9 kg (152 lb)   BMI 28.26 kg/m   Estimated body mass index is 28.26 kg/m  as calculated from the following:    Height as of 20: 1.562 m (5' 1.5\").    Weight as of this encounter: 68.9 kg (152 lb).  BP completed using cuff size: regular    Questioned patient about current smoking habits.  Pt. has never smoked.          The following HM Due: NONE    +fetal movement  -swelling    Marcy Lima, CMA    "

## 2020-06-15 NOTE — PROGRESS NOTES
IUP at 35w5d,    +FM, BH throughout the day, no more than 2/hr  Level II within normal limits, no EIF identified.  Reviewed PTL.   GBS next visit.   Provided with Rx for breast pump.   Return to clinic weekly through delivery.   Aysha Montilla MD

## 2020-06-19 ENCOUNTER — TELEPHONE (OUTPATIENT)
Dept: OBGYN | Facility: CLINIC | Age: 30
End: 2020-06-19

## 2020-06-19 NOTE — TELEPHONE ENCOUNTER
Pt calling regarding her GBS test.  It is due to be done at her upcoming visit (6/22 when she will be 37.5w), which is with Dr Lala (she normally sees Dr Montilla)  She is wondering if this can be done at the following visit with Dr Montilla who she is more comfortable with.  I explained the test and how it is done, the importance of it and why we do it.  I also stated it would be per provider's discretion if they would hold off until following visit.      Pt requesting I send message to MD to see if it is possible to do it the following week.    Please advise.    Susan Funez, RN

## 2020-06-19 NOTE — TELEPHONE ENCOUNTER
Since she is just going to be 36w5d when I see her, as long as she is not having a lot of contractions that would suggest she may go into labor earlier than expected, it would be okay to defer her GBS until the following visit, provided she is going to actually get into Dr. Montilla at that time.  I would advise her to confirm an appt is available to be scheduled w/ Dr. Montilla the following week, because if not then she may want to consider getting the GBS done with me regardless.

## 2020-06-22 ENCOUNTER — PRENATAL OFFICE VISIT (OUTPATIENT)
Dept: OBGYN | Facility: CLINIC | Age: 30
End: 2020-06-22
Payer: COMMERCIAL

## 2020-06-22 VITALS — DIASTOLIC BLOOD PRESSURE: 68 MMHG | SYSTOLIC BLOOD PRESSURE: 112 MMHG | BODY MASS INDEX: 28.26 KG/M2 | WEIGHT: 152 LBS

## 2020-06-22 DIAGNOSIS — O09.899 RUBELLA NON-IMMUNE STATUS, ANTEPARTUM: ICD-10-CM

## 2020-06-22 DIAGNOSIS — Z28.39 RUBELLA NON-IMMUNE STATUS, ANTEPARTUM: ICD-10-CM

## 2020-06-22 DIAGNOSIS — Z34.03 ENCOUNTER FOR SUPERVISION OF NORMAL FIRST PREGNANCY IN THIRD TRIMESTER: Primary | ICD-10-CM

## 2020-06-22 PROCEDURE — 99207 ZZC PRENATAL VISIT: CPT | Performed by: OBSTETRICS & GYNECOLOGY

## 2020-06-22 NOTE — NURSING NOTE
"Chief Complaint   Patient presents with     Prenatal Care     36 5/7 weeks       Initial /68   Wt 68.9 kg (152 lb)   BMI 28.26 kg/m   Estimated body mass index is 28.26 kg/m  as calculated from the following:    Height as of 20: 1.562 m (5' 1.5\").    Weight as of this encounter: 68.9 kg (152 lb).  BP completed using cuff size: regular    Questioned patient about current smoking habits.  Pt. has never smoked.          The following HM Due: NONE  +fetal movement  -swelling    Marcy Lima, CMA    "

## 2020-06-22 NOTE — PROGRESS NOTES
No c/o's. Wants to defer GBS until next visit per Pt preference.  Understands small chance of SROM or labor ensuing before GBS results back making decision on Abx less clear.  Fetal movement counts BID,  labor/premature rupture of membranes precautions reviewed.  RTC 1 week(s).    Encounter Diagnoses   Name Primary?     Encounter for supervision of normal first pregnancy in third trimester Yes     Rubella non-immune status, antepartum        Risk factors listed above are stable and being addressed as noted.    Nomi Lala MD  [facility]

## 2020-07-02 ENCOUNTER — PRENATAL OFFICE VISIT (OUTPATIENT)
Dept: OBGYN | Facility: CLINIC | Age: 30
End: 2020-07-02
Payer: COMMERCIAL

## 2020-07-02 VITALS
SYSTOLIC BLOOD PRESSURE: 92 MMHG | HEIGHT: 62 IN | WEIGHT: 155.4 LBS | DIASTOLIC BLOOD PRESSURE: 58 MMHG | BODY MASS INDEX: 28.6 KG/M2

## 2020-07-02 DIAGNOSIS — Z34.03 ENCOUNTER FOR SUPERVISION OF NORMAL FIRST PREGNANCY IN THIRD TRIMESTER: Primary | ICD-10-CM

## 2020-07-02 PROCEDURE — 87653 STREP B DNA AMP PROBE: CPT | Performed by: FAMILY MEDICINE

## 2020-07-02 PROCEDURE — 99207 ZZC PRENATAL VISIT: CPT | Performed by: FAMILY MEDICINE

## 2020-07-02 ASSESSMENT — MIFFLIN-ST. JEOR: SCORE: 1380.2

## 2020-07-02 NOTE — PATIENT INSTRUCTIONS
"Return weekly   Return to clinic:  every 4 weeks till 28 weeks, then every 2 weeks till 36 weeks, then weekly till delivery      Phone numbers [place]:  Day/ night [phone] ask for ob triage  Emergency:  Call labor and delivery:  [phone]    What should I call about??    Contraction every 5 minutes for 1 hour 1 minute long (511), bleeding, loss of fluid, headache that doesn't resolve with tylenol, and decreased fetal movement     Start kick counts @ 26-28 weeks   There is an alejandra for this!  It is called \"count the kicks\"  Keep track of movement and discover your normal baby movement pattern   guideline is listed below  Please call if you do not feel the baby move!  We will have you come in for fetal heart rate monitoring:   Perception of at least 10 FMs during 12 hours of normal maternal activity   Perception of least 10 FMs over two hours when the mother is at rest and focused on [facility] Address   [address], [place], [place] [address] [phone]    Dr. Valentina Concepcion, DO    OB/GYN   [facility] and [facility]                                                      "

## 2020-07-02 NOTE — NURSING NOTE
"38w1d    Chief Complaint   Patient presents with     Prenatal Care     GBS due       Initial BP 92/58   Ht 1.562 m (5' 1.5\")   Wt 70.5 kg (155 lb 6.4 oz)   LMP 10/09/2019 (LMP Unknown)   BMI 28.89 kg/m   Estimated body mass index is 28.89 kg/m  as calculated from the following:    Height as of this encounter: 1.562 m (5' 1.5\").    Weight as of this encounter: 70.5 kg (155 lb 6.4 oz).  BP completed using cuff size: regular    Questioned patient about current smoking habits.  Pt. has never smoked.          The following HM Due: NONE      "

## 2020-07-02 NOTE — PROGRESS NOTES
"CC: Here for routine prenatal visit   29 year old y/o  @ 38w1d with Estimated Date of Delivery: Jul 15, 2020     BP 92/58   Ht 1.562 m (5' 1.5\")   Wt 70.5 kg (155 lb 6.4 oz)   LMP 10/09/2019 (LMP Unknown)   BMI 28.89 kg/m    See OB flowsheet  + fetal movement, no contractions, no bleeding, no loss of fluid   Discussed monitoring fetal movement     1) concerns: gbs today   Covid-19 concerns: none   2) Routine: RNI, nl quad, XX, O+, flu done, tdap done  3) Risk factors:   A: nl MFM us:  4) Return: weekly     Jamey    "

## 2020-07-03 LAB
GP B STREP DNA SPEC QL NAA+PROBE: NEGATIVE
SPECIMEN SOURCE: NORMAL

## 2020-07-09 ENCOUNTER — PRENATAL OFFICE VISIT (OUTPATIENT)
Dept: OBGYN | Facility: CLINIC | Age: 30
End: 2020-07-09
Payer: COMMERCIAL

## 2020-07-09 VITALS — SYSTOLIC BLOOD PRESSURE: 112 MMHG | WEIGHT: 154.9 LBS | BODY MASS INDEX: 28.79 KG/M2 | DIASTOLIC BLOOD PRESSURE: 60 MMHG

## 2020-07-09 DIAGNOSIS — Z34.03 ENCOUNTER FOR SUPERVISION OF NORMAL FIRST PREGNANCY IN THIRD TRIMESTER: Primary | ICD-10-CM

## 2020-07-09 PROCEDURE — 99207 ZZC PRENATAL VISIT: CPT | Performed by: OBSTETRICS & GYNECOLOGY

## 2020-07-09 NOTE — NURSING NOTE
"Chief Complaint   Patient presents with     Prenatal Care     39 weeks 1 day, no c/o vaginal bleeding, leaking of fluids, contractions. Feeling fetal movement        Initial /60   Wt 70.3 kg (154 lb 14.4 oz)   LMP 10/09/2019 (LMP Unknown)   BMI 28.79 kg/m   Estimated body mass index is 28.79 kg/m  as calculated from the following:    Height as of 20: 1.562 m (5' 1.5\").    Weight as of this encounter: 70.3 kg (154 lb 14.4 oz).  BP completed using cuff size: regular    Questioned patient about current smoking habits.  Pt. has never smoked.          The following HM Due: NONE      Monisha Perera CMA               "

## 2020-07-09 NOTE — PROGRESS NOTES
CC: Here for routine prenatal visit   29 year old y/o  @ 39w1d with OLEG: Jul 15, 2020      /60   Wt 70.3 kg (154 lb 14.4 oz)   LMP 10/09/2019 (LMP Unknown)   BMI 28.79 kg/m     See OB flowsheet  + fetal movement, occasional BH contractions, no bleeding, no loss of fluid   Discussed monitoring fetal movement      1) concerns: Group B Strep negative. Declines membrane sweeping. Reviewed IOL at 41w if no labor by then   Covid-19 concerns: none   2) Routine: RNI, nl quad, XX, O+, flu done, tdap done  3) Risk factors:   A: nl MFM us:  4) Return: weekly     Aysha Montilla MD

## 2020-07-14 ENCOUNTER — TELEPHONE (OUTPATIENT)
Dept: OBGYN | Facility: CLINIC | Age: 30
End: 2020-07-14

## 2020-07-14 NOTE — TELEPHONE ENCOUNTER
Received Short term disability forms today via fax.  Fax completed form to: [phone]    Form filled out and placed in Dr. Montilla's bin at desk for review and completion.

## 2020-07-16 ENCOUNTER — PRENATAL OFFICE VISIT (OUTPATIENT)
Dept: OBGYN | Facility: CLINIC | Age: 30
End: 2020-07-16
Payer: COMMERCIAL

## 2020-07-16 VITALS
BODY MASS INDEX: 28.84 KG/M2 | WEIGHT: 156.7 LBS | DIASTOLIC BLOOD PRESSURE: 70 MMHG | SYSTOLIC BLOOD PRESSURE: 110 MMHG | HEIGHT: 62 IN

## 2020-07-16 DIAGNOSIS — Z34.03 ENCOUNTER FOR SUPERVISION OF NORMAL FIRST PREGNANCY IN THIRD TRIMESTER: ICD-10-CM

## 2020-07-16 DIAGNOSIS — O48.0 POST-TERM PREGNANCY, 40-42 WEEKS OF GESTATION: Primary | ICD-10-CM

## 2020-07-16 PROCEDURE — 99207 ZZC PRENATAL VISIT: CPT | Performed by: OBSTETRICS & GYNECOLOGY

## 2020-07-16 ASSESSMENT — MIFFLIN-ST. JEOR: SCORE: 1386.1

## 2020-07-16 NOTE — NURSING NOTE
"40w1d    Chief Complaint   Patient presents with     Prenatal Care       Initial /70   Ht 1.562 m (5' 1.5\")   Wt 71.1 kg (156 lb 11.2 oz)   LMP 10/09/2019 (LMP Unknown)   BMI 29.13 kg/m   Estimated body mass index is 29.13 kg/m  as calculated from the following:    Height as of this encounter: 1.562 m (5' 1.5\").    Weight as of this encounter: 71.1 kg (156 lb 11.2 oz).  BP completed using cuff size: regular    Questioned patient about current smoking habits.  Pt. has never smoked.          The following HM Due: NONE         "

## 2020-07-16 NOTE — PROGRESS NOTES
"Doing well.   Denies VB, ctx, LOF. +FM  /70   Ht 1.562 m (5' 1.5\")   Wt 71.1 kg (156 lb 11.2 oz)   LMP 10/09/2019 (LMP Unknown)   BMI 29.13 kg/m    General Appearance: NAD  Abdomen: Gravid, NT  Refer to flow sheet above.   A/P: 29 year old  at 40w1d  -- has expressed desire defer induction of labor at 41w and elects to wait on spontaneous labor until 42w; reviewed risks of postdates gestation (ie stillbirth, meconium stained amniotic fluid which might lead to meconium stained  and the need for  surveillance past 41w gestation; patient conveys understanding of risks; twice weekly BPPs ordered to be done between 41w and 42w gestation   -- labor precautions reviewed  RTC in 1 week    MD Windy Desai MD              "

## 2020-07-17 ENCOUNTER — TELEPHONE (OUTPATIENT)
Dept: OBGYN | Facility: CLINIC | Age: 30
End: 2020-07-17

## 2020-07-17 ENCOUNTER — HOSPITAL ENCOUNTER (INPATIENT)
Facility: CLINIC | Age: 30
LOS: 1 days | Discharge: HOME | End: 2020-07-18
Attending: OBSTETRICS & GYNECOLOGY | Admitting: OBSTETRICS & GYNECOLOGY
Payer: COMMERCIAL

## 2020-07-17 ENCOUNTER — ANESTHESIA EVENT (OUTPATIENT)
Dept: OBGYN | Facility: CLINIC | Age: 30
End: 2020-07-17
Payer: COMMERCIAL

## 2020-07-17 ENCOUNTER — ANESTHESIA (OUTPATIENT)
Dept: OBGYN | Facility: CLINIC | Age: 30
End: 2020-07-17
Payer: COMMERCIAL

## 2020-07-17 ENCOUNTER — NURSE TRIAGE (OUTPATIENT)
Dept: NURSING | Facility: CLINIC | Age: 30
End: 2020-07-17

## 2020-07-17 LAB
ABO + RH BLD: NORMAL
ABO + RH BLD: NORMAL
HGB BLD-MCNC: 13.4 G/DL (ref 11.7–15.7)
LABORATORY COMMENT REPORT: NORMAL
SARS-COV-2 RNA SPEC QL NAA+PROBE: NEGATIVE
SARS-COV-2 RNA SPEC QL NAA+PROBE: NORMAL
SPECIMEN EXP DATE BLD: NORMAL
SPECIMEN SOURCE: NORMAL
SPECIMEN SOURCE: NORMAL

## 2020-07-17 PROCEDURE — 25000128 H RX IP 250 OP 636: Performed by: ANESTHESIOLOGY

## 2020-07-17 PROCEDURE — 59400 OBSTETRICAL CARE: CPT | Performed by: OBSTETRICS & GYNECOLOGY

## 2020-07-17 PROCEDURE — 36415 COLL VENOUS BLD VENIPUNCTURE: CPT | Performed by: OBSTETRICS & GYNECOLOGY

## 2020-07-17 PROCEDURE — 25000125 ZZHC RX 250: Performed by: OBSTETRICS & GYNECOLOGY

## 2020-07-17 PROCEDURE — 40000671 ZZH STATISTIC ANESTHESIA CASE

## 2020-07-17 PROCEDURE — G0463 HOSPITAL OUTPT CLINIC VISIT: HCPCS

## 2020-07-17 PROCEDURE — 86901 BLOOD TYPING SEROLOGIC RH(D): CPT | Performed by: OBSTETRICS & GYNECOLOGY

## 2020-07-17 PROCEDURE — 37000011 ZZH ANESTHESIA WARD SERVICE

## 2020-07-17 PROCEDURE — 10907ZC DRAINAGE OF AMNIOTIC FLUID, THERAPEUTIC FROM PRODUCTS OF CONCEPTION, VIA NATURAL OR ARTIFICIAL OPENING: ICD-10-PCS | Performed by: OBSTETRICS & GYNECOLOGY

## 2020-07-17 PROCEDURE — 3E0R3BZ INTRODUCTION OF ANESTHETIC AGENT INTO SPINAL CANAL, PERCUTANEOUS APPROACH: ICD-10-PCS | Performed by: ANESTHESIOLOGY

## 2020-07-17 PROCEDURE — 86900 BLOOD TYPING SEROLOGIC ABO: CPT | Performed by: OBSTETRICS & GYNECOLOGY

## 2020-07-17 PROCEDURE — 0UQMXZZ REPAIR VULVA, EXTERNAL APPROACH: ICD-10-PCS | Performed by: OBSTETRICS & GYNECOLOGY

## 2020-07-17 PROCEDURE — 25800030 ZZH RX IP 258 OP 636: Performed by: OBSTETRICS & GYNECOLOGY

## 2020-07-17 PROCEDURE — 12000000 ZZH R&B MED SURG/OB

## 2020-07-17 PROCEDURE — 00HU33Z INSERTION OF INFUSION DEVICE INTO SPINAL CANAL, PERCUTANEOUS APPROACH: ICD-10-PCS | Performed by: ANESTHESIOLOGY

## 2020-07-17 PROCEDURE — 72200001 ZZH LABOR CARE VAGINAL DELIVERY SINGLE

## 2020-07-17 PROCEDURE — 25000132 ZZH RX MED GY IP 250 OP 250 PS 637: Performed by: OBSTETRICS & GYNECOLOGY

## 2020-07-17 PROCEDURE — 85018 HEMOGLOBIN: CPT | Performed by: OBSTETRICS & GYNECOLOGY

## 2020-07-17 PROCEDURE — 86780 TREPONEMA PALLIDUM: CPT | Performed by: OBSTETRICS & GYNECOLOGY

## 2020-07-17 PROCEDURE — U0003 INFECTIOUS AGENT DETECTION BY NUCLEIC ACID (DNA OR RNA); SEVERE ACUTE RESPIRATORY SYNDROME CORONAVIRUS 2 (SARS-COV-2) (CORONAVIRUS DISEASE [COVID-19]), AMPLIFIED PROBE TECHNIQUE, MAKING USE OF HIGH THROUGHPUT TECHNOLOGIES AS DESCRIBED BY CMS-2020-01-R: HCPCS | Performed by: OBSTETRICS & GYNECOLOGY

## 2020-07-17 RX ORDER — OXYTOCIN/0.9 % SODIUM CHLORIDE 30/500 ML
100 PLASTIC BAG, INJECTION (ML) INTRAVENOUS CONTINUOUS
Status: DISCONTINUED | OUTPATIENT
Start: 2020-07-17 | End: 2020-07-18 | Stop reason: HOSPADM

## 2020-07-17 RX ORDER — HYDROCORTISONE 2.5 %
CREAM (GRAM) TOPICAL 3 TIMES DAILY PRN
Status: DISCONTINUED | OUTPATIENT
Start: 2020-07-17 | End: 2020-07-18 | Stop reason: HOSPADM

## 2020-07-17 RX ORDER — SODIUM CHLORIDE, SODIUM LACTATE, POTASSIUM CHLORIDE, CALCIUM CHLORIDE 600; 310; 30; 20 MG/100ML; MG/100ML; MG/100ML; MG/100ML
INJECTION, SOLUTION INTRAVENOUS CONTINUOUS
Status: DISCONTINUED | OUTPATIENT
Start: 2020-07-17 | End: 2020-07-17

## 2020-07-17 RX ORDER — ONDANSETRON 2 MG/ML
4 INJECTION INTRAMUSCULAR; INTRAVENOUS EVERY 6 HOURS PRN
Status: DISCONTINUED | OUTPATIENT
Start: 2020-07-17 | End: 2020-07-17

## 2020-07-17 RX ORDER — NALOXONE HYDROCHLORIDE 0.4 MG/ML
.1-.4 INJECTION, SOLUTION INTRAMUSCULAR; INTRAVENOUS; SUBCUTANEOUS
Status: DISCONTINUED | OUTPATIENT
Start: 2020-07-17 | End: 2020-07-17

## 2020-07-17 RX ORDER — LIDOCAINE HYDROCHLORIDE AND EPINEPHRINE 15; 5 MG/ML; UG/ML
3 INJECTION, SOLUTION EPIDURAL
Status: DISCONTINUED | OUTPATIENT
Start: 2020-07-17 | End: 2020-07-17

## 2020-07-17 RX ORDER — BUPIVACAINE HYDROCHLORIDE 2.5 MG/ML
INJECTION, SOLUTION EPIDURAL; INFILTRATION; INTRACAUDAL PRN
Status: DISCONTINUED | OUTPATIENT
Start: 2020-07-17 | End: 2020-07-17

## 2020-07-17 RX ORDER — PHENYLEPHRINE HCL IN 0.9% NACL 1 MG/10 ML
100 SYRINGE (ML) INTRAVENOUS EVERY 5 MIN PRN
Status: DISCONTINUED | OUTPATIENT
Start: 2020-07-17 | End: 2020-07-17

## 2020-07-17 RX ORDER — IBUPROFEN 800 MG/1
800 TABLET, FILM COATED ORAL EVERY 6 HOURS PRN
Status: DISCONTINUED | OUTPATIENT
Start: 2020-07-17 | End: 2020-07-18 | Stop reason: HOSPADM

## 2020-07-17 RX ORDER — NALOXONE HYDROCHLORIDE 0.4 MG/ML
.1-.4 INJECTION, SOLUTION INTRAMUSCULAR; INTRAVENOUS; SUBCUTANEOUS
Status: DISCONTINUED | OUTPATIENT
Start: 2020-07-17 | End: 2020-07-18 | Stop reason: HOSPADM

## 2020-07-17 RX ORDER — TRANEXAMIC ACID 10 MG/ML
1 INJECTION, SOLUTION INTRAVENOUS EVERY 30 MIN PRN
Status: DISCONTINUED | OUTPATIENT
Start: 2020-07-17 | End: 2020-07-18 | Stop reason: HOSPADM

## 2020-07-17 RX ORDER — LIDOCAINE 40 MG/G
CREAM TOPICAL
Status: DISCONTINUED | OUTPATIENT
Start: 2020-07-17 | End: 2020-07-17

## 2020-07-17 RX ORDER — OXYTOCIN/0.9 % SODIUM CHLORIDE 30/500 ML
340 PLASTIC BAG, INJECTION (ML) INTRAVENOUS CONTINUOUS PRN
Status: DISCONTINUED | OUTPATIENT
Start: 2020-07-17 | End: 2020-07-18 | Stop reason: HOSPADM

## 2020-07-17 RX ORDER — ACETAMINOPHEN 325 MG/1
650 TABLET ORAL EVERY 4 HOURS PRN
Status: DISCONTINUED | OUTPATIENT
Start: 2020-07-17 | End: 2020-07-17

## 2020-07-17 RX ORDER — OXYTOCIN/0.9 % SODIUM CHLORIDE 30/500 ML
100-340 PLASTIC BAG, INJECTION (ML) INTRAVENOUS CONTINUOUS PRN
Status: COMPLETED | OUTPATIENT
Start: 2020-07-17 | End: 2020-07-17

## 2020-07-17 RX ORDER — MISOPROSTOL 200 UG/1
800 TABLET ORAL ONCE
Status: COMPLETED | OUTPATIENT
Start: 2020-07-17 | End: 2020-07-17

## 2020-07-17 RX ORDER — OXYTOCIN/0.9 % SODIUM CHLORIDE 30/500 ML
1-24 PLASTIC BAG, INJECTION (ML) INTRAVENOUS CONTINUOUS
Status: DISCONTINUED | OUTPATIENT
Start: 2020-07-17 | End: 2020-07-17

## 2020-07-17 RX ORDER — ONDANSETRON 4 MG/1
4 TABLET, ORALLY DISINTEGRATING ORAL EVERY 6 HOURS PRN
Status: DISCONTINUED | OUTPATIENT
Start: 2020-07-17 | End: 2020-07-17

## 2020-07-17 RX ORDER — CARBOPROST TROMETHAMINE 250 UG/ML
250 INJECTION, SOLUTION INTRAMUSCULAR
Status: DISCONTINUED | OUTPATIENT
Start: 2020-07-17 | End: 2020-07-17

## 2020-07-17 RX ORDER — FENTANYL CITRATE 50 UG/ML
50-100 INJECTION, SOLUTION INTRAMUSCULAR; INTRAVENOUS
Status: DISCONTINUED | OUTPATIENT
Start: 2020-07-17 | End: 2020-07-17

## 2020-07-17 RX ORDER — TRANEXAMIC ACID 10 MG/ML
1 INJECTION, SOLUTION INTRAVENOUS EVERY 30 MIN PRN
Status: DISCONTINUED | OUTPATIENT
Start: 2020-07-17 | End: 2020-07-17

## 2020-07-17 RX ORDER — NALBUPHINE HYDROCHLORIDE 20 MG/ML
2.5-5 INJECTION, SOLUTION INTRAMUSCULAR; INTRAVENOUS; SUBCUTANEOUS EVERY 6 HOURS PRN
Status: DISCONTINUED | OUTPATIENT
Start: 2020-07-17 | End: 2020-07-17

## 2020-07-17 RX ORDER — OXYTOCIN 10 [USP'U]/ML
10 INJECTION, SOLUTION INTRAMUSCULAR; INTRAVENOUS
Status: DISCONTINUED | OUTPATIENT
Start: 2020-07-17 | End: 2020-07-18 | Stop reason: HOSPADM

## 2020-07-17 RX ORDER — AMOXICILLIN 250 MG
1 CAPSULE ORAL 2 TIMES DAILY
Status: DISCONTINUED | OUTPATIENT
Start: 2020-07-17 | End: 2020-07-18 | Stop reason: HOSPADM

## 2020-07-17 RX ORDER — METHYLERGONOVINE MALEATE 0.2 MG/ML
200 INJECTION INTRAVENOUS
Status: DISCONTINUED | OUTPATIENT
Start: 2020-07-17 | End: 2020-07-17

## 2020-07-17 RX ORDER — BISACODYL 10 MG
10 SUPPOSITORY, RECTAL RECTAL DAILY PRN
Status: DISCONTINUED | OUTPATIENT
Start: 2020-07-19 | End: 2020-07-18 | Stop reason: HOSPADM

## 2020-07-17 RX ORDER — OXYTOCIN 10 [USP'U]/ML
10 INJECTION, SOLUTION INTRAMUSCULAR; INTRAVENOUS
Status: DISCONTINUED | OUTPATIENT
Start: 2020-07-17 | End: 2020-07-17

## 2020-07-17 RX ORDER — ACETAMINOPHEN 325 MG/1
650 TABLET ORAL EVERY 4 HOURS PRN
Status: DISCONTINUED | OUTPATIENT
Start: 2020-07-17 | End: 2020-07-18 | Stop reason: HOSPADM

## 2020-07-17 RX ORDER — OXYCODONE AND ACETAMINOPHEN 5; 325 MG/1; MG/1
1 TABLET ORAL
Status: DISCONTINUED | OUTPATIENT
Start: 2020-07-17 | End: 2020-07-17

## 2020-07-17 RX ORDER — AMOXICILLIN 250 MG
2 CAPSULE ORAL 2 TIMES DAILY
Status: DISCONTINUED | OUTPATIENT
Start: 2020-07-17 | End: 2020-07-18 | Stop reason: HOSPADM

## 2020-07-17 RX ORDER — IBUPROFEN 800 MG/1
800 TABLET, FILM COATED ORAL
Status: COMPLETED | OUTPATIENT
Start: 2020-07-17 | End: 2020-07-17

## 2020-07-17 RX ORDER — MODIFIED LANOLIN
OINTMENT (GRAM) TOPICAL
Status: DISCONTINUED | OUTPATIENT
Start: 2020-07-17 | End: 2020-07-18 | Stop reason: HOSPADM

## 2020-07-17 RX ADMIN — IBUPROFEN 800 MG: 800 TABLET ORAL at 18:11

## 2020-07-17 RX ADMIN — Medication 2 MILLI-UNITS/MIN: at 13:10

## 2020-07-17 RX ADMIN — SODIUM CHLORIDE, POTASSIUM CHLORIDE, SODIUM LACTATE AND CALCIUM CHLORIDE: 600; 310; 30; 20 INJECTION, SOLUTION INTRAVENOUS at 14:57

## 2020-07-17 RX ADMIN — MISOPROSTOL 800 MCG: 200 TABLET ORAL at 20:53

## 2020-07-17 RX ADMIN — Medication 10 ML/HR: at 14:57

## 2020-07-17 RX ADMIN — SODIUM CHLORIDE, POTASSIUM CHLORIDE, SODIUM LACTATE AND CALCIUM CHLORIDE: 600; 310; 30; 20 INJECTION, SOLUTION INTRAVENOUS at 11:34

## 2020-07-17 RX ADMIN — BUPIVACAINE HYDROCHLORIDE 15 ML: 2.5 INJECTION, SOLUTION EPIDURAL; INFILTRATION; INTRACAUDAL at 14:46

## 2020-07-17 RX ADMIN — Medication 100 ML/HR: at 17:02

## 2020-07-17 NOTE — ANESTHESIA PREPROCEDURE EVALUATION
Anesthesia Pre-Procedure Evaluation    Patient: Emily Linares   MRN: [id number] : 1990          Preoperative Diagnosis: * No surgery found *        Past Medical History:   Diagnosis Date     Elevated cholesterol      Fibula fracture     Lt.     CRUZ (headache)      History reviewed. No pertinent surgical history.  Anesthesia Evaluation       history and physical reviewed .      No history of anesthetic complications          ROS/MED HX    ENT/Pulmonary:  - neg pulmonary ROS     Neurologic:  - neg neurologic ROS     Cardiovascular:  - neg cardiovascular ROS       METS/Exercise Tolerance:     Hematologic:         Musculoskeletal:         GI/Hepatic:  - neg GI/hepatic ROS       Renal/Genitourinary:         Endo:         Psychiatric:         Infectious Disease:         Malignancy:         Other:                     neg OB ROS            Physical Exam  Normal systems: cardiovascular, pulmonary and dental    Airway   Mallampati: II  TM distance: > 3 FB  Neck ROM: full  Mouth opening: > 3 cm    Dental     Cardiovascular       Pulmonary     Other findings: Lab Test        20                       1144          1419          1557          1546          WBC           --          19.0*        19.0*        11.1*         HGB          13.4         12.3         12.6         13.8          MCV           --          92           88           88            PLT           --          213          399          316            Lab Test        16                       1546          NA           140           POTASSIUM    4.2           CHLORIDE     108           CO2          22            BUN          16            CR           0.95          ANIONGAP     10            GOYO          8.8           GLC          85                         Lab Results   Component Value Date    WBC 19.0 (H) 2020    HGB 13.4 2020    HCT 37.1 2020     2020     2016     "POTASSIUM 4.2 08/25/2016    CHLORIDE 108 08/25/2016    CO2 22 08/25/2016    BUN 16 08/25/2016    CR 0.95 08/25/2016    GLC 85 08/25/2016    GOYO 8.8 08/25/2016    ALBUMIN 3.9 08/25/2016    PROTTOTAL 7.2 08/25/2016    ALT 27 08/25/2016    AST 35 08/25/2016    ALKPHOS 53 08/25/2016    BILITOTAL 0.2 08/25/2016    TSH 1.32 08/25/2016    HCG Positive (A) 11/26/2019       Preop Vitals  BP Readings from Last 3 Encounters:   07/17/20 101/60   07/16/20 110/70   07/09/20 112/60    Pulse Readings from Last 3 Encounters:   07/17/20 75   11/26/19 82   08/19/19 73      Resp Readings from Last 3 Encounters:   07/17/20 16   11/26/19 20   07/05/11 20    SpO2 Readings from Last 3 Encounters:   11/26/19 97%   12/31/18 97%   08/25/16 97%      Temp Readings from Last 1 Encounters:   07/17/20 98.6  F (37  C) (Oral)    Ht Readings from Last 1 Encounters:   07/16/20 1.562 m (5' 1.5\")      Wt Readings from Last 1 Encounters:   07/16/20 71.1 kg (156 lb 11.2 oz)    Estimated body mass index is 29.13 kg/m  as calculated from the following:    Height as of 7/16/20: 1.562 m (5' 1.5\").    Weight as of 7/16/20: 71.1 kg (156 lb 11.2 oz).       Anesthesia Plan      History & Physical Review      ASA Status:  2 .  OB Epidural Asa: 2       Plan for Epidural            Postoperative Care      Consents  Anesthetic plan, risks, benefits and alternatives discussed with:  Patient..                 Louie Hennessy MD                    .  "

## 2020-07-17 NOTE — PROVIDER NOTIFICATION
07/17/20 1500   Provider Notification   Provider Name/Title Dr. Montilla   Method of Notification Electronic Page   Notification Reason Status Update   5-6/80/-1. Epidural in. , mod variability, occasional vartiable. CXN 2-4 min apart.

## 2020-07-17 NOTE — ANESTHESIA PROCEDURE NOTES
Procedure note :  Staff -   Anesthesiologist:  Louie Hennessy MD      Performed By: anesthesiologist    Referred By: apolonia knight    Pre-Procedure  Performed by Louie Hennessy MD  Referred by apolonia knight  Location:       .         Assessment/Narrative  .  .  . Comments:  Pt seen and interviewed prior to epidural placement for labor analgesia.  This epidural is to be placed in anticipation of normal vaginal delivery.  Risk discussed and consent given prior to performance of procedure.  Time out consisting of identification of patient and desire for epidural analgesia was confirmed.  Sterile prep of lumbar spine was accomplished with povidone iodine three times.  L34 interspace was identified.  Local anesthetic infiltration with 1.5% lido with epi 1/200k was performed with 1.5 cc.  17 G Tuohy needle was advanced in the midline with loss of resistance technique.  No CSF, blood, or paresthesias were noted with placement.  Test dose of 1.5% Lidocaine with epi 1/200k, 3 cc., was injected without evidence of intrathecal or intravascular injection.  Incremental bolus of 0.25% Bupivicaine was injected to a total volume of 15 cc.  Epidural cath 19 G was advanced through needle approx. 6 cm.  No paresthesia was noted with placement and aspiration of cath was negative for blood.  Catheter secured with tegaderm and tape.  Epidural infusion begun after double check of pump settings.  Nursing to inform if there are any complications, but none were noted prior to leaving patient room.  I, or my partners, remain immediately available for management of any issues or complications.  I, or my partners, will monitor at appropriate intervals.  ANGELIC Hennessy MD

## 2020-07-17 NOTE — L&D DELIVERY NOTE
OB Vaginal Delivery Note    Emily Linares MRN# [id number]   Age: 29 year old YOB: 1990       GA: 40w2d  GP:   Labor Complications: None   Delivery QBL: 184 mL  Delivery Type: Vaginal, Spontaneous   ROM to Delivery Time: (Delivered) Minutes: 13   Weight: 3.125 kg (6 lb 14.2 oz)    1 Minute 5 Minute 10 Minute   Apgar Totals:                 Delivery Details:  Emily Linares, a 29 year old  female delivered a viable infant with apgars not yet recorded. Patient presented in early active labor and was augmented with pitocin. She underwent AROM when she was completely dilated and delivered 13 minutes later. Delivery was via vaginal, spontaneous  to a sterile field under   anesthesia. Infant delivered in vertex  left  occiput  anterior  position. Anterior and posterior shoulders delivered without difficulty. The cord was clamped, cut twice and   were noted. Cord blood was obtained in routine fashion with the following disposition:  .      Cord complications:    Placenta delivered at 2020  5:01 PM . Placental disposition was Hospital disposal . Fundal massage performed and fundus found to be firm.     Episiotomy: none    Perineum, vagina, cervix were inspected, and the following lacerations were noted:   Perineal lacerations: 1st   periurethral laceration: left             Any lacerations were repaired in the usual fashion using 3-0 and 4-0 vicryl.    Excellent hemostasis was noted. Needle count correct. Infant and patient in delivery room in good and stable condition.        Labor Event Times    Labor onset date:  20    Dilation complete date:  20 Complete time:   4:45 PM   Start pushing date/time:  2020 1649      Labor Length    2nd Stage (hrs):  0 (min):  13   3rd Stage (hrs):  0 (min):  3      Labor Events     labor?:  No  Labor Type:  Spontaneous, Augmentation, AROM     Rupture date/time: 20 1645   Rupture type:  Artificial Rupture of Membranes  Fluid  "color:  Clear  Fluid odor:  Normal     Augmentation:  Oxytocin     Delivery/Placenta Date and Time    Delivery Date:  20 Delivery Time:   4:58 PM   Placenta Date/Time:  2020  5:01 PM     Vaginal Counts     Initial count performed by 2 team members:   Two Team Members   Krystle Montilla       Needles Suture [employer]   Initial counts 2  5    Added to count  2     Final counts       Placed during labor Accounted for at the end of labor   No NA   No NA   No NA           Apgars    Living status:  Living   1 Minute 5 Minute 10 Minute 15 Minute 20 Minute   Skin color:        Heart rate:        Reflex irritability:        Muscle tone:        Respiratory effort:        Total:            Measurements    Weight:  6 lb 14.2 oz Length:  1' 7.5\"   Head circumference:  34.9 cm       Skin to Skin and Feeding Plan    Skin to skin initiation date/time: 1841    Skin to skin with:  Other Family Member, Mother  Skin to skin end date/time: 1841    How do you plan to feed your baby:  Breastfeeding     Labor Events and Shoulder Dystocia    Fetal Tracing Prior to Delivery:  Category 2  Shoulder dystocia present?:  Neg     Delivery (Maternal) (Provider to Complete) (475045)    Episiotomy:  None  Perineal lacerations:  1st Repaired?:  Yes   Periurethral laceration:  left Repaired?:  Yes      Blood Loss  Mother: Emily Linares #[id number]   Start of Mother's Information    IO Blood Loss  20 0458 - 20 1802    Delivery QBL (mL) Hospital Encounter 184 mL    Total  184 mL         End of Mother's Information  Mother: Emily Linares #[id number]         Delivery - Provider to Complete (682533)    Delivering clinician:  Aysha Montilla MD  Delivery Type (Choose the 1 that will go to the Birth History):  Vaginal, Spontaneous          Placenta    Delayed Cord Clamping:  Done  Date/Time:  2020  5:01 PM  Removal:  Spontaneous  Disposition:  Hospital disposal     Presentation and " Position    Presentation:  Vertex  Position:  Left Occiput Anterior           Aysha Montilla MD

## 2020-07-17 NOTE — TELEPHONE ENCOUNTER
OLEG 7/15/20  40w2d  Planning to deliver at [facility]  Dr. Montilla  1st baby    Contractions   Timing for 1 hour  5-8 min apart  Lasting 20-50 sec  Rates pain during contraction 5-6/10    Unable to sleep through them  Contractions stay consistent with change in activity    Baby has been moving the same    Patient believes she lost mucus plug last night    Had an exam yesterday    For the last 2-3 times when she went to the bathroom, there was some blood when she would wipe     No vaginal bleeding (except as noted above)  No leakage of fluid  No fever  No cough   No shortness of breath  No hand/face swelling  No blurred vision  No headache    Triaged to a disposition of Home Care. Home Care Advice given. Patient is agreeable. She will call back when she progresses.     COVID 19 Nurse Triage Plan/Patient Instructions    Please be aware that novel coronavirus (COVID-19) may be circulating in the community. If you develop symptoms such as fever, cough, or SOB or if you have concerns about the presence of another infection including coronavirus (COVID-19), please contact your health care provider or visit [email or web address].     Disposition/Instructions    Home care recommended. Follow home care protocol based instructions.    Thank you for taking steps to prevent the spread of this virus.  o Limit your contact with others.  o Wear a simple mask to cover your cough.  o Wash your hands well and often.    Resources    [facility]: About COVID-19: [email or web address]    CDC: What to Do If You're Sick: www.cdc.gov/coronavirus/2019-ncov/about/steps-when-sick.html    CDC: Ending Home Isolation: www.cdc.gov/coronavirus/2019-ncov/hcp/disposition-in-home-patients.html     CDC: Caring for Someone: www.cdc.gov/coronavirus/2019-ncov/if-you-are-sick/care-for-someone.html     [facility]: Interim Guidance for Hospital Discharge to Home: www.health.[facility].[place]/diseases/coronavirus/hcp/hospdischarge.pdf    [facility]  "[place] clinical trials (COVID-19 research studies): clinicalaffairs.[facility]-clinical-trials     Below are the COVID-19 hotlines at the [place] Department of Health ([facility]). Interpreters are available.   o For health questions: Call 119-238-9399 or 1-626.942.4245 (7 a.m. to 7 p.m.)  o For questions about schools and childcare: Call 585-563-9543 or 1-478.866.1630 (7 a.m. to 7 p.m.)     Additional Information    Negative: Passed out (i.e., lost consciousness, collapsed and was not responding)    Negative: Shock suspected (e.g., cold/pale/clammy skin, too weak to stand, low BP, rapid pulse)    Negative: Difficult to awaken or acting confused (e.g., disoriented, slurred speech)    Negative: [1] SEVERE abdominal pain (e.g., excruciating) AND [2] constant AND [3] present > 1 hour    Negative: Severe bleeding (e.g., continuous red blood from vagina, or large blood clots)    Negative: Umbilical cord hanging out of the vagina (shiny, white, curled appearance, \"like telephone cord\")    Negative: Uncontrollable urge to push (i.e., feels like baby is coming out now)    Negative: Can see baby    Negative: Sounds like a life-threatening emergency to the triager    Negative: Pregnant < 37 weeks (i.e., )    Negative: [1] Uncertain delivery date AND [2] possibly pregnant < 37 weeks (i.e., )    [1] First baby (primipara) AND [2] contractions > 5 minutes apart, or for < 2 hours    Negative: MODERATE-SEVERE abdominal pain    Negative: Fever > 100.4 F (38.0 C)    Negative: [1] Leakage of fluid from vagina AND [2] leakage started < 4 hours ago    Negative: Patient sounds very sick or weak to the triager    Negative: [1] First baby (primipara) AND [2] contractions < 6 minutes apart  AND [3] present 2 hours    Negative: [1] History of prior delivery (multipara) AND [2] contractions < 10 minutes apart AND [3] present 1 hour    Negative: [1] History of rapid prior delivery AND [2] contractions < 10 minutes apart    " Negative: [1] Leakage of fluid from vagina AND [2] green or brown in color    Negative: [1] Leakage of fluid from vagina AND [2] leakage started > 4 hours ago    Negative: Vaginal bleeding or spotting    Negative: Baby moving less today (e.g., kick count < 5 in 1 hour or < 10 in 2 hours)    Negative: Severe headache or headache that won't go away    Negative: New blurred vision or vision changes    Protocols used: PREGNANCY - LABOR-A-    Jaja Fernandez RN on 7/17/2020 at 5:08 AM

## 2020-07-17 NOTE — H&P
No significant change in general health status based on examination of the patient, review of Nursing Admission Database and prenatal record.    Emily Linares is a 29 year old female  at 40w2d here with early active labor. GBS-.     1) admit to L&D  2). EFW 6-7lb  3) comfort measures when appropriate  4) Anticipate     Aysha Montilla MD

## 2020-07-17 NOTE — TELEPHONE ENCOUNTER
Pt called back, ctx have been every 4-6 minutes for the past 1 1/2 hours.  No LOF, slight bloody show noted.  Normal FM.    Pt advised to go to L&D to rule out labor.  L&D notified.  Will page on call to notify.    Susan Funez RN

## 2020-07-17 NOTE — PLAN OF CARE
Data: [facility] presented to [facility]: 2020 10:12 AM.  Reason for maternal/fetal assessment is uterine contractions. Patient reports contractions since 0300.  Patient is a .  Prenatal record reviewed. Pregnancy has been uncomplicated..  Gestational Age 40w2d. VSS. Fetal movement present. Patient denies leaking of vaginal fluid/rupture of membranes, vaginal bleeding, abdominal pain, pelvic pressure, nausea, vomiting, headache, visual disturbances, epigastric or URQ pain, significant edema. Support person is present.   Action: Verbal consent for EFM. Triage assessment completed. Bill of rights reviewed.  Response: Patient verbalized agreement with plan. Will contact Dr Aysha Montilla with update and further orders.

## 2020-07-17 NOTE — PROVIDER NOTIFICATION
07/17/20 1049   Provider Notification   Provider Name/Title Dr. Montilla   Method of Notification Phone   Notification Reason Patient Arrived   Notified of patient's arrival to rule out labor. Reported SVE and FHR Category 1. Received orders to admit for labor.

## 2020-07-17 NOTE — PROVIDER NOTIFICATION
07/17/20 1300   Provider Notification   Provider Name/Title Dr. Montilla   Method of Notification In Department   Received order for to start pitocin.

## 2020-07-18 VITALS
TEMPERATURE: 98.4 F | HEART RATE: 75 BPM | DIASTOLIC BLOOD PRESSURE: 68 MMHG | RESPIRATION RATE: 16 BRPM | OXYGEN SATURATION: 99 % | SYSTOLIC BLOOD PRESSURE: 118 MMHG

## 2020-07-18 LAB
HGB BLD-MCNC: 12.6 G/DL (ref 11.7–15.7)
T PALLIDUM AB SER QL: NONREACTIVE

## 2020-07-18 PROCEDURE — 36416 COLLJ CAPILLARY BLOOD SPEC: CPT | Performed by: OBSTETRICS & GYNECOLOGY

## 2020-07-18 PROCEDURE — 25000128 H RX IP 250 OP 636: Performed by: OBSTETRICS & GYNECOLOGY

## 2020-07-18 PROCEDURE — 25000132 ZZH RX MED GY IP 250 OP 250 PS 637: Performed by: OBSTETRICS & GYNECOLOGY

## 2020-07-18 PROCEDURE — 85018 HEMOGLOBIN: CPT | Performed by: OBSTETRICS & GYNECOLOGY

## 2020-07-18 PROCEDURE — 90707 MMR VACCINE SC: CPT | Performed by: OBSTETRICS & GYNECOLOGY

## 2020-07-18 RX ORDER — IBUPROFEN 800 MG/1
800 TABLET, FILM COATED ORAL EVERY 6 HOURS PRN
Qty: 30 TABLET | Refills: 1 | Status: SHIPPED | OUTPATIENT
Start: 2020-07-18 | End: 2020-09-23

## 2020-07-18 RX ADMIN — MEASLES, MUMPS, AND RUBELLA VIRUS VACCINE LIVE 0.5 ML: 1000; 12500; 1000 INJECTION, POWDER, LYOPHILIZED, FOR SUSPENSION SUBCUTANEOUS at 19:58

## 2020-07-18 RX ADMIN — IBUPROFEN 800 MG: 800 TABLET ORAL at 08:59

## 2020-07-18 RX ADMIN — IBUPROFEN 800 MG: 800 TABLET ORAL at 15:10

## 2020-07-18 RX ADMIN — IBUPROFEN 800 MG: 800 TABLET ORAL at 02:09

## 2020-07-18 NOTE — DISCHARGE SUMMARY
Postpartum progress note  Discharge summary  2020     S: doing well and desires discharge home today. Pain is well controlled. Lochia minimal. Ambulating without difficulty. Voiding without difficulty. Passing flatus, no BM yet.  Tolerating po intake.     PHYSICAL EXAM:   Vitals:    20 1845 20 2300 20 0212 20 0857   BP: 104/55 112/58 102/58 114/63   Pulse:       Resp:   16   Temp:  98.6  F (37  C)  98.7  F (37.1  C)   TempSrc:  Oral  Oral   SpO2:           General: sitting up, alert and cooperative  Abd: soft, non-distended, non-tender. Fundus firm, nontender, 2 cm below umbilicus.   Extremities: calves nontender, trace edema of lower extremities bilaterally      Lab Results   Component Value Date    HGB 12.6 2020    HGB 13.4 2020     Blood type:   Lab Results   Component Value Date    RH Pos 2020         ASSESSMENT: 29 year old  PPD 1 s/p .     PLAN:  - Heme: 13.4 > 12.6, no s/s ABLA  - Feeding: breast  - Birth control: not discused   - Rh status: pos, Rhogam not indicated   - Dispo: home today if baby also able to discharge    Neetu El MD, MPH  [facility]

## 2020-07-18 NOTE — ANESTHESIA POSTPROCEDURE EVALUATION
Patient: Emily Linares    * No procedures listed *    Diagnosis:* No pre-op diagnosis entered *  Diagnosis Additional Information: No value filed.    Anesthesia Type:  Epidural    Note:  Anesthesia Post Evaluation    Patient participation: Able to participate in evaluation but full recovery from regional anesthesia has not yet ocurrred but is anticipated to occur within 48 hours       Comments:     S/P epidural for labor.   I or my partner was immediately available for management of this patient during epidural analgesia infusion.  VSS.  Doing well. Block resolved.  Neuro at baseline. Denies positional headache. Minimal side effects easily managed w/ PRN meds. No apparent anesthetic complications. No follow-up required.    Kevin Mata MD        Last vitals:  Vitals:    07/17/20 1845 07/17/20 2300 07/18/20 0212   BP: 104/55 112/58 102/58   Pulse:      Resp:  16 16   Temp:  98.6  F (37  C)    SpO2:            Electronically Signed By: Kevin Mata MD  July 18, 2020  8:43 AM

## 2020-07-18 NOTE — PLAN OF CARE
Patient is stable and doing very well today. Denying pain except with ambulation. Taking ibuprofen for effective control of that discomfort. Independent with self and  cares. Moderate amounts of assistance provided with breast feeding.  present and supportive. Education and support provided.

## 2020-07-18 NOTE — PLAN OF CARE
VSS. Fundus firm, bleeding WNL. Ambulating to bathroom and voiding independently. No concerns with breastfeeding at this time. Ibuprofen given for cramping. SO at bedside, supportive.    0600: Patient called for breastfeeding assistance. Infant frantic at breast but unable to calm down enough to successfully latch. HE and STS done. Football and cradle hold taught. Discouraged further use of pacifier until breastfeeding more established.

## 2020-07-18 NOTE — PROVIDER NOTIFICATION
07/17/20 2043   Provider Notification   Provider Name/Title Dr. JOSE ELIAS Montilla   Method of Notification Phone   Request Evaluate - Remote   Notification Reason Other (Comment)     OB updated patient up to bathroom and writer noticed constant dripping of blood. Unable to see where bleeding is coming from. If unable to see source, OB would like 800 rectal cytotec given.

## 2020-07-18 NOTE — PLAN OF CARE
Data: Emily Linares transferred to 429 via wheelchair at 2100. Baby transferred via parent's arms.  Action: Receiving unit notified of transfer: Yes. Patient and family notified of room change. Report given to OWODY Davis at 2100. Belongings sent to receiving unit. Accompanied by Registered Nurse. Oriented patient to surroundings. Call light within reach. ID bands double-checked with receiving RN.  Response: Patient tolerated transfer and is stable.    Patients mobililty level scored using the bedside mobility assistance tool (BMAT). Patient is at a mobility level test number: 4. Mobility equipment used: none required. Required assist of 0 staff members. Further use of BMAT scoring not required.

## 2020-07-19 NOTE — PLAN OF CARE
Patient discharged to home at 2035. Reviewed AVS and all questions answered. Patient understands when to follow-up with her doctor. Declines a breast pump. Discharge medications given. MMR given. In stable condition at time of discharge.

## 2020-07-19 NOTE — DISCHARGE INSTRUCTIONS
[facility]: [id number]  [facility]: [phone]      Postpartum Vaginal Delivery Instructions    Activity       Ask family and friends for help when you need it.    Do not place anything in your vagina for 6 weeks.    You are not restricted on other activities, but take it easy for a few weeks to allow your body to recover from delivery.  You are able to do any activities you feel up to that point.    No driving until you have stopped taking your pain medications (usually two weeks after delivery).     Call your health care provider if you have any of these symptoms:       Increased pain, swelling, redness, or fluid around your stiches from an episiotomy or perineal tear.    A fever above 100.4 F (38 C) with or without chills when placing a thermometer under your tongue.    You soak a sanitary pad with blood within 1 hour, or you see blood clots larger than a golf ball.    Bleeding that lasts more than 6 weeks.    Vaginal discharge that smells bad.    Severe pain, cramping or tenderness in your lower belly area.    A need to urinate more frequently (use the toilet more often), more urgently (use the toilet very quickly), or it burns when you urinate.    Nausea and vomiting.    Redness, swelling or pain around a vein in your leg.    Problems breastfeeding or a red or painful area on your breast.    Chest pain and cough or are gasping for air.    Problems coping with sadness, anxiety, or depression.  If you have any concerns about hurting yourself or the baby, call your provider immediately.     You have questions or concerns after you return home.     Keep your hands clean:  Always wash your hands before touching your perineal area and stitches.  This helps reduce your risk of infection.  If your hands aren't dirty, you may use an alcohol hand-rub to clean your hands. Keep your nails clean and short.

## 2020-07-20 ENCOUNTER — TELEPHONE (OUTPATIENT)
Dept: OBGYN | Facility: CLINIC | Age: 30
End: 2020-07-20

## 2020-07-20 NOTE — TELEPHONE ENCOUNTER
Form received from: [employer]    Form requesting following info/need: STD    LETI needed?: No    Location of form: Rhonda's desk    When completed the route for return: Fax [phone]

## 2020-07-23 NOTE — TELEPHONE ENCOUNTER
[employer] said they didn't get the forms.    Can we refax and pt wants them emailed to her.  She is aware it is not secure and doesn't mind.    [email or web address]    Paperwork due  By Monday    Yennifer PENDLETON R.N.

## 2020-07-24 ENCOUNTER — TELEPHONE (OUTPATIENT)
Dept: OBGYN | Facility: CLINIC | Age: 30
End: 2020-07-24

## 2020-07-24 NOTE — TELEPHONE ENCOUNTER
Form received from: Patient    Form requesting following info/need: FMLA    LETI needed?: No    Location of form: Rhonda's desk    When completed the route for return: Fax [phone]

## 2020-07-24 NOTE — TELEPHONE ENCOUNTER
Patient calls back and says she wants this emailed to her- this is indicated. Was this done? Please email these to her. Thank you.

## 2020-09-22 ENCOUNTER — MEDICAL CORRESPONDENCE (OUTPATIENT)
Dept: HEALTH INFORMATION MANAGEMENT | Facility: CLINIC | Age: 30
End: 2020-09-22

## 2020-09-23 ENCOUNTER — PRENATAL OFFICE VISIT (OUTPATIENT)
Dept: OBGYN | Facility: CLINIC | Age: 30
End: 2020-09-23
Payer: COMMERCIAL

## 2020-09-23 VITALS — WEIGHT: 126.4 LBS | DIASTOLIC BLOOD PRESSURE: 60 MMHG | BODY MASS INDEX: 23.5 KG/M2 | SYSTOLIC BLOOD PRESSURE: 104 MMHG

## 2020-09-23 DIAGNOSIS — B00.1 HERPES LABIALIS: ICD-10-CM

## 2020-09-23 DIAGNOSIS — Z23 NEED FOR PROPHYLACTIC VACCINATION AND INOCULATION AGAINST INFLUENZA: ICD-10-CM

## 2020-09-23 PROCEDURE — 90471 IMMUNIZATION ADMIN: CPT | Performed by: OBSTETRICS & GYNECOLOGY

## 2020-09-23 PROCEDURE — 90686 IIV4 VACC NO PRSV 0.5 ML IM: CPT | Performed by: OBSTETRICS & GYNECOLOGY

## 2020-09-23 PROCEDURE — 99207 ZZC POST PARTUM EXAM: CPT | Performed by: OBSTETRICS & GYNECOLOGY

## 2020-09-23 RX ORDER — ACETAMINOPHEN AND CODEINE PHOSPHATE 120; 12 MG/5ML; MG/5ML
0.35 SOLUTION ORAL DAILY
Qty: 90 TABLET | Refills: 3 | Status: SHIPPED | OUTPATIENT
Start: 2020-09-23 | End: 2021-12-13

## 2020-09-23 RX ORDER — VALACYCLOVIR HYDROCHLORIDE 1 G/1
2000 TABLET, FILM COATED ORAL 2 TIMES DAILY
Qty: 20 TABLET | Refills: 3 | Status: SHIPPED | OUTPATIENT
Start: 2020-09-23 | End: 2020-10-05

## 2020-09-23 ASSESSMENT — PATIENT HEALTH QUESTIONNAIRE - PHQ9: SUM OF ALL RESPONSES TO PHQ QUESTIONS 1-9: 0

## 2020-09-23 NOTE — PROGRESS NOTES
SUBJECTIVE: Emily is here for a 6-week postpartum checkup.    Date of Last Pap:  2019    Delivery date was 20. She had a  of a viable girl, weight 6 pounds 14 oz., with none complications.  Since delivery, she has been breast feeding.  She has no signs of infection, bleeding or other complications.  We discussed contraceptions and she has chosen oral contraceptives and Unsure.  She  has had intercourse since delivery and complains of No discomfort. Patient screened for postpartum depression and complaints are NEGATIVE. Screening has also been completed for intimate partner violence.      EXAM:  /60   Wt 57.3 kg (126 lb 6.4 oz)   LMP 10/09/2019 (LMP Unknown)   BMI 23.50 kg/m     Gen: sitting in chair in no acute distress, comfortable  Eyes: no scleral icterus, EOMI  Pulm: no increased work of breathing, no cough  Skin: warm and dry, no rashes/lesions  Psych: mood stable, appropriate affect  Neuro: A+Ox3       ASSESSMENT:   Normal postpartum exam after .    PLAN:  1. Routine postpartum follow-up  - no further pelvic rest  - POP until done breast feeding, then return to combined oral contraceptives .   - norethindrone (MICRONOR) 0.35 MG tablet; Take 1 tablet (0.35 mg) by mouth daily  Dispense: 90 tablet; Refill: 3    2. Need for prophylactic vaccination and inoculation against influenza  - INFLUENZA VACCINE IM > 6 MONTHS VALENT IIV4 [47360]    3. Herpes labialis  - valACYclovir (VALTREX) 1000 mg tablet; Take 2 tablets (2,000 mg) by mouth 2 times daily For 1 day.  Dispense: 20 tablet; Refill: 3   No further pelvic rest.     Return as needed or at time of next expected pap, pelvic, or breast exam.    Aysha Montilla MD

## 2020-09-23 NOTE — NURSING NOTE
"Chief Complaint   Patient presents with     Postpartum Care      on 20, Feeling good.      Contraception     was thinking about the pill, but has some questions regarding milk production and OCP.        Initial /60   Wt 57.3 kg (126 lb 6.4 oz)   LMP 10/09/2019 (LMP Unknown)   BMI 23.50 kg/m   Estimated body mass index is 23.5 kg/m  as calculated from the following:    Height as of 20: 1.562 m (5' 1.5\").    Weight as of this encounter: 57.3 kg (126 lb 6.4 oz).  BP completed using cuff size: regular    Questioned patient about current smoking habits.  Pt. has never smoked.          The following HM Due: NONE      Monisha Perera CMA               "

## 2020-10-01 ENCOUNTER — TELEPHONE (OUTPATIENT)
Dept: OBGYN | Facility: CLINIC | Age: 30
End: 2020-10-01

## 2020-10-01 NOTE — TELEPHONE ENCOUNTER
Form received from: Patient     Form requesting following info/need: Fit Duty    LETI needed?: No    Location of form: Rhonda's desk    When completed the route for return: Fax [phone]

## 2020-10-05 DIAGNOSIS — B00.1 HERPES LABIALIS: ICD-10-CM

## 2020-10-05 RX ORDER — VALACYCLOVIR HYDROCHLORIDE 1 G/1
2000 TABLET, FILM COATED ORAL 2 TIMES DAILY
Qty: 20 TABLET | Refills: 3 | Status: SHIPPED | OUTPATIENT
Start: 2020-10-05 | End: 2022-01-12

## 2020-11-18 ENCOUNTER — MEDICAL CORRESPONDENCE (OUTPATIENT)
Dept: HEALTH INFORMATION MANAGEMENT | Facility: CLINIC | Age: 30
End: 2020-11-18

## 2020-11-30 ENCOUNTER — OFFICE VISIT (OUTPATIENT)
Dept: FAMILY MEDICINE | Facility: CLINIC | Age: 30
End: 2020-11-30
Payer: COMMERCIAL

## 2020-11-30 VITALS
TEMPERATURE: 98.6 F | BODY MASS INDEX: 22.87 KG/M2 | SYSTOLIC BLOOD PRESSURE: 109 MMHG | DIASTOLIC BLOOD PRESSURE: 68 MMHG | HEART RATE: 80 BPM | HEIGHT: 62 IN | WEIGHT: 124.3 LBS | OXYGEN SATURATION: 96 %

## 2020-11-30 DIAGNOSIS — L98.9 SKIN LESION: Primary | ICD-10-CM

## 2020-11-30 PROCEDURE — 99213 OFFICE O/P EST LOW 20 MIN: CPT | Performed by: FAMILY MEDICINE

## 2020-11-30 ASSESSMENT — MIFFLIN-ST. JEOR: SCORE: 1234.13

## 2020-11-30 ASSESSMENT — ENCOUNTER SYMPTOMS
COLOR CHANGE: 0
UNEXPECTED WEIGHT CHANGE: 1

## 2020-11-30 NOTE — PATIENT INSTRUCTIONS
Patient Education     Checking for Skin Cancer  You can find cancer early by checking your skin each month. There are 3 kinds of skin cancer. They are melanoma, basal cell carcinoma, and squamous cell carcinoma. Doing monthly skin checks is the best way to find new marks or skin changes. Follow the instructions below for checking your skin.   The ABCDEs of checking moles for melanoma   Check your moles or growths for signs of melanoma using ABCDE:     Asymmetry: the sides of the mole or growth don t match    Border: the edges are ragged, notched, or blurred    Color: the color within the mole or growth varies    Diameter: the mole or growth is larger than 6 mm (size of a pencil eraser)    Evolving: the size, shape, or color of the mole or growth is changing (evolving is not shown in the images below)    Checking for other types of skin cancer  Basal cell carcinoma or squamous cell carcinoma have symptoms such as:       A spot or mole that looks different from all other marks on your skin    Changes in how an area feels, such as itching, tenderness, or pain    Changes in the skin's surface, such as oozing, bleeding, or scaliness    A sore that does not heal    New swelling or redness beyond the border of a mole    Who s at risk?  Anyone can get skin cancer. But you are at greater risk if you have:     Fair skin, light-colored hair, or light-colored eyes    Many moles or abnormal moles on your skin    A history of sunburns from sunlight or tanning beds    A family history of skin cancer    A history of exposure to radiation or chemicals    A weakened immune system  If you have had skin cancer in the past, you are at risk for recurring skin cancer.   How to check your skin  Do your monthly skin checkups in front of a full-length mirror. Check all parts of your body, including your:     Head (ears, face, neck, and scalp)    Torso (front, back, and sides)    Arms (tops, undersides, upper, and lower armpits)    Hands  (palms, backs, and fingers, including under the nails)    Buttocks and genitals    Legs (front, back, and sides)    Feet (tops, soles, toes, including under the nails, and between toes)  If you have a lot of moles, take digital photos of them each month. Make sure to take photos both up close and from a distance. These can help you see if any moles change over time.   Most skin changes are not cancer. But if you see any changes in your skin, call your doctor right away. Only he or she can diagnose a problem. If you have skin cancer, seeing your doctor can be the first step toward getting the treatment that could save your life.   [employer] last reviewed this educational content on 4/1/2019 2000-2020 [employer] [place]. [address] [place] [address], [place] [address]. All rights reserved. This information is not intended as a substitute for professional medical care. Always follow your healthcare professional's instructions.

## 2020-11-30 NOTE — PROGRESS NOTES
Future Appointments   Date Time Provider Department Center   11/30/2020  9:00 AM Devonte Griffiths MD [facility]     Appointment Notes for this encounter:   Reviewed - ZG. Mole eval / poss mole treatment    Health Maintenance Due   Topic Date Due     ANNUAL REVIEW OF HM ORDERS  1991     EYE EXAM  01/05/2013     Health Maintenance addressed:  Eye Exam    Eye Exam Previously completed - pt reported / Care Everywhere / Completed LETI - sent to abstract - [facility] -     MyChart Status:  Active and Using

## 2020-11-30 NOTE — PROGRESS NOTES
"Subjective     Emily Linares is a 30 year old female who presents to clinic today for the following health issues:    HPI         Concern - mole   Onset: years   Description: left upper quad - side   Intensity:   Progression of Symptoms:  constant  Accompanying Signs & Symptoms: no pain  Previous history of similar problem:   Precipitating factors:        Worsened by:   Alleviating factors:        Improved by:   Therapies tried and outcome:     Patient is a very pleasant 30-year-old new mom with a 4-month-old at home who presents to clinic concern for a mole.  Has been under left side of her rib cage for years and has not changed in color, size.  One time did get a zipper caught on it however it does not itch, any pain, does not bleed.  No known family history of skin cancers.      Review of Systems   Constitutional: Positive for unexpected weight change.   Skin: Negative for color change.            Objective    /68 (BP Location: Right arm, Patient Position: Chair, Cuff Size: Adult Regular)   Pulse 80   Temp 98.6  F (37  C) (Oral)   Ht 1.562 m (5' 1.5\")   Wt 56.4 kg (124 lb 4.8 oz)   SpO2 96%   BMI 23.11 kg/m    Body mass index is 23.11 kg/m .  Physical Exam  Constitutional:       Appearance: She is not ill-appearing.   Skin:     Comments: 0.5 cm exophytic, brown in color lesion on the left lower rib cage.  Nontender, no erythema.                Assessment & Plan     Skin lesion  Chronic problem, asymptomatic.  Reviewed the ABCDE criteria for abnormal mole monitoring.  As its been asymptomatic and does not have any worrisome features, explained to patient this would be a cosmetic procedure.  Will refer to cosmetic dermatology.   - DERMATOLOGY ADULT REFERRAL; Future            Return in about 1 year (around 11/30/2021) for Physical Exam.    Devonte Griffiths MD  [facility]      Answers for HPI/ROS submitted by the patient on 11/30/2020   Chronic problems general questions HPI " Form  How many servings of fruits and vegetables do you eat daily?: 2-3  On average, how many sweetened beverages do you drink each day (Examples: soda, juice, sweet tea, etc.  Do NOT count diet or artificially sweetened beverages)?: 0  How many minutes a day do you exercise enough to make your heart beat faster?: 20 to 29  How many days a week do you exercise enough to make your heart beat faster?: 3 or less  How many days per week do you miss taking your medication?: 0

## 2021-01-18 ENCOUNTER — MEDICAL CORRESPONDENCE (OUTPATIENT)
Dept: HEALTH INFORMATION MANAGEMENT | Facility: CLINIC | Age: 31
End: 2021-01-18

## 2021-09-26 ENCOUNTER — HEALTH MAINTENANCE LETTER (OUTPATIENT)
Age: 31
End: 2021-09-26

## 2021-11-21 ENCOUNTER — HEALTH MAINTENANCE LETTER (OUTPATIENT)
Age: 31
End: 2021-11-21

## 2021-12-06 ENCOUNTER — ANCILLARY PROCEDURE (OUTPATIENT)
Dept: ULTRASOUND IMAGING | Facility: CLINIC | Age: 31
End: 2021-12-06
Payer: COMMERCIAL

## 2021-12-06 DIAGNOSIS — Z32.01 PREGNANCY TEST POSITIVE: ICD-10-CM

## 2021-12-06 DIAGNOSIS — Z32.01 PREGNANCY TEST POSITIVE: Primary | ICD-10-CM

## 2021-12-06 PROCEDURE — 76801 OB US < 14 WKS SINGLE FETUS: CPT | Performed by: OBSTETRICS & GYNECOLOGY

## 2021-12-06 PROCEDURE — 76817 TRANSVAGINAL US OBSTETRIC: CPT | Performed by: OBSTETRICS & GYNECOLOGY

## 2021-12-13 ENCOUNTER — PRENATAL OFFICE VISIT (OUTPATIENT)
Dept: NURSING | Facility: CLINIC | Age: 31
End: 2021-12-13
Payer: COMMERCIAL

## 2021-12-13 DIAGNOSIS — Z34.90 SUPERVISION OF NORMAL PREGNANCY: Primary | ICD-10-CM

## 2021-12-13 PROCEDURE — 99207 PR NO CHARGE NURSE ONLY: CPT

## 2021-12-13 NOTE — PROGRESS NOTES
NPN nurse visit done over the phone. Pt will be given NPN folder and book at her upcoming appt.   Discussed optional screening available to assess chromosomal anomalies. Questions answered. Pt advised to call the clinic if she has any questions or concerns related to her pregnancy. Prenatal labs will be obtained at her upcoming appt. New prenatal visit scheduled on 1/14/22 with Dr Montilla.    7w1d    Lab Results   Component Value Date    PAP NIL 08/19/2019           Patient supplied answers from flow sheet for:  Prenatal OB Questionnaire.  Past Medical History  Have you ever recieved care for your mental health? : (P) No  Have you ever been in a major accident or suffered serious trauma?: (P) No  Within the last year, has anyone hit, slapped, kicked or otherwise hurt you?: (P) No  In the last year, has anyone forced you to have sex when you didn't want to?: (P) No    Past Medical History 2   Have you ever received a blood transfusion?: (P) No  Would you accept a blood transfusion if was medically recommended?: (P) Yes  Does anyone in your home smoke?: (P) No   Is your blood type Rh negative?: (P) No  Have you ever ?: (!) (P) Yes  Have you been hospitalized for a nonsurgical reason excluding normal delivery?: (P) No  Have you ever had an abnormal pap smear?: (P) No    Past Medical History (Continued)  Do you have a history of abnormalities of the uterus?: (P) No  Did your mother take LEVON or any other hormones when she was pregnant with you?: (P) No  Do you have any other problems we have not asked about which you feel may be important to this pregnancy?: (P) No      Susan Funez RN

## 2021-12-20 ENCOUNTER — LAB (OUTPATIENT)
Dept: LAB | Facility: CLINIC | Age: 31
End: 2021-12-20
Payer: COMMERCIAL

## 2021-12-20 DIAGNOSIS — Z34.90 SUPERVISION OF NORMAL PREGNANCY: ICD-10-CM

## 2021-12-20 LAB
ABO/RH(D): NORMAL
ANTIBODY SCREEN: NEGATIVE
ERYTHROCYTE [DISTWIDTH] IN BLOOD BY AUTOMATED COUNT: 12.5 % (ref 10–15)
HBV SURFACE AG SERPL QL IA: NONREACTIVE
HCT VFR BLD AUTO: 38.9 % (ref 35–47)
HCV AB SERPL QL IA: NONREACTIVE
HGB BLD-MCNC: 13.7 G/DL (ref 11.7–15.7)
HIV 1+2 AB+HIV1 P24 AG SERPL QL IA: NONREACTIVE
MCH RBC QN AUTO: 31.3 PG (ref 26.5–33)
MCHC RBC AUTO-ENTMCNC: 35.2 G/DL (ref 31.5–36.5)
MCV RBC AUTO: 89 FL (ref 78–100)
PLATELET # BLD AUTO: 343 10E3/UL (ref 150–450)
RBC # BLD AUTO: 4.38 10E6/UL (ref 3.8–5.2)
SPECIMEN EXPIRATION DATE: NORMAL
T PALLIDUM AB SER QL: NONREACTIVE
WBC # BLD AUTO: 16.3 10E3/UL (ref 4–11)

## 2021-12-20 PROCEDURE — 85027 COMPLETE CBC AUTOMATED: CPT

## 2021-12-20 PROCEDURE — 86803 HEPATITIS C AB TEST: CPT

## 2021-12-20 PROCEDURE — 87389 HIV-1 AG W/HIV-1&-2 AB AG IA: CPT

## 2021-12-20 PROCEDURE — 87340 HEPATITIS B SURFACE AG IA: CPT

## 2021-12-20 PROCEDURE — 36415 COLL VENOUS BLD VENIPUNCTURE: CPT

## 2021-12-20 PROCEDURE — 86901 BLOOD TYPING SEROLOGIC RH(D): CPT

## 2021-12-20 PROCEDURE — 86762 RUBELLA ANTIBODY: CPT

## 2021-12-20 PROCEDURE — 86900 BLOOD TYPING SEROLOGIC ABO: CPT

## 2021-12-20 PROCEDURE — 87086 URINE CULTURE/COLONY COUNT: CPT

## 2021-12-20 PROCEDURE — 86780 TREPONEMA PALLIDUM: CPT

## 2021-12-20 PROCEDURE — 86850 RBC ANTIBODY SCREEN: CPT

## 2021-12-21 LAB
BACTERIA UR CULT: NO GROWTH
RUBV IGG SERPL QL IA: 4.71 INDEX
RUBV IGG SERPL QL IA: POSITIVE

## 2022-01-10 DIAGNOSIS — B00.1 HERPES LABIALIS: ICD-10-CM

## 2022-01-12 RX ORDER — VALACYCLOVIR HYDROCHLORIDE 1 G/1
TABLET, FILM COATED ORAL
Qty: 20 TABLET | Refills: 3 | Status: SHIPPED | OUTPATIENT
Start: 2022-01-12 | End: 2023-01-04

## 2022-02-04 ENCOUNTER — PRENATAL OFFICE VISIT (OUTPATIENT)
Dept: OBGYN | Facility: CLINIC | Age: 32
End: 2022-02-04
Payer: COMMERCIAL

## 2022-02-04 VITALS — SYSTOLIC BLOOD PRESSURE: 106 MMHG | WEIGHT: 130.2 LBS | BODY MASS INDEX: 24.2 KG/M2 | DIASTOLIC BLOOD PRESSURE: 60 MMHG

## 2022-02-04 DIAGNOSIS — Z34.82 NORMAL PREGNANCY IN MULTIGRAVIDA IN SECOND TRIMESTER: Primary | ICD-10-CM

## 2022-02-04 PROCEDURE — 87591 N.GONORRHOEAE DNA AMP PROB: CPT | Performed by: OBSTETRICS & GYNECOLOGY

## 2022-02-04 PROCEDURE — 99207 PR FIRST OB VISIT: CPT | Performed by: OBSTETRICS & GYNECOLOGY

## 2022-02-04 PROCEDURE — 87491 CHLMYD TRACH DNA AMP PROBE: CPT | Performed by: OBSTETRICS & GYNECOLOGY

## 2022-02-04 NOTE — NURSING NOTE
"Chief Complaint   Patient presents with     Prenatal Care     NPN provider visit   14w5d  No new concerns    initial /60   Wt 59.1 kg (130 lb 3.2 oz)   Breastfeeding No   BMI 24.20 kg/m   Estimated body mass index is 24.2 kg/m  as calculated from the following:    Height as of 11/30/20: 1.562 m (5' 1.5\").    Weight as of this encounter: 59.1 kg (130 lb 3.2 oz).  BP completed using cuff size regular.  Ignacia Merida CMA    "

## 2022-02-04 NOTE — PROGRESS NOTES
New OB Visit  Emily Linares   2022   14w5d     Subjective: Emily Linares 31 year old  at 14w5d dated by early ultrasound here today for initial OB visit. Patient reports No Problems. Denies cramping and vaginal spotting.     Gyn History:   Menses: LMP: No LMP recorded. Patient is pregnant. frequency: q month  Sexually transmitted disease history: none.    Occupation: HR  Exercise: active  Diet: balanced  H/o Chicken Pox or Varicella Vaccination: Yes    History of GDM: No   Hx PTL : No   History of HTN in pregnancy: No   Shoulder dystocia: No   Vacuum Extraction: No   PPH: No   3rd of 4th degree laceration: No   Other complications: No    Since her last LMP she denies use of alcohol, tobacco and street drugs.    OBhx:  x 1  OB History    Para Term  AB Living   2 1 1 0 0 1   SAB IAB Ectopic Multiple Live Births   0 0 0 0 1      # Outcome Date GA Lbr Ubaldo/2nd Weight Sex Delivery Anes PTL Lv   2 Current            1 Term 20 40w2d / 00:13 3.125 kg (6 lb 14.2 oz) F Vag-Spont EPI N AURA      Name: MYRAFEMALE-EMILY      Apgar1: 8  Apgar5: 9       ROS: Ten point review of systems was reviewed and negative except the above.    HISTORY:  Past Medical History:   Diagnosis Date     Elevated cholesterol      Fibula fracture     Lt.     CRUZ (headache)      History reviewed. No pertinent surgical history.  Family History   Problem Relation Age of Onset     Cancer Maternal Grandfather      Diabetes Paternal Grandmother      Cerebrovascular Disease Paternal Grandmother      Hypertension No family hx of      Thyroid Disease No family hx of      Glaucoma No family hx of      Macular Degeneration No family hx of      Social History     Socioeconomic History     Marital status:      Spouse name: Milton     Number of children: 1     Years of education: None     Highest education level: None   Occupational History     Occupation: Human Resources   Tobacco Use     Smoking status:  Never Smoker     Smokeless tobacco: Never Used   Vaping Use     Vaping Use: Never used   Substance and Sexual Activity     Alcohol use: Not Currently     Alcohol/week: 0.0 standard drinks     Comment: 2 times  a month, none since pregnant     Drug use: No     Sexual activity: Yes     Partners: Male     Comment: Pregnant   Other Topics Concern     Parent/sibling w/ CABG, MI or angioplasty before 65F 55M? No   Social History Narrative     None     Social Determinants of Health     Financial Resource Strain: Not on file   Food Insecurity: Not on file   Transportation Needs: Not on file   Physical Activity: Not on file   Stress: Not on file   Social Connections: Not on file   Intimate Partner Violence: Not on file   Housing Stability: Not on file     Current Outpatient Medications   Medication Sig     Prenatal Vit-Fe Fumarate-FA (PRENATAL MULTIVITAMIN W/IRON) 27-0.8 MG tablet Take 1 tablet by mouth daily     valACYclovir (VALTREX) 1000 mg tablet TAKE 2 TABLETS(2000 MG) BY MOUTH TWICE DAILY FOR 1 DAY (Patient not taking: Reported on 2/4/2022)     No current facility-administered medications for this visit.     No Known Allergies    Past medical, surgical, social and family history were reviewed and updated in [facility].      EXAM:  /60   Wt 59.1 kg (130 lb 3.2 oz)   Breastfeeding No   BMI 24.20 kg/m       Gen:  no acute distress, comfortable  HENT: No scleral injection or icterus  CV: Regular rate and rhythm, no murmur  Resp: CTAB, Normal work of breathing, no cough  GI: Abdomen soft, non-tender. No masses, organomegaly  Skin: No suspicious lesions or rashes  Psychiatric: mentation appears normal and affect bright   Pelvis: External genitalia within normal limits. Urethra is without lesion. Bladder is nontender.    On speculum exam, cervix is without lesion and vagina is normal without lesion or discharge. Pap smear not obtained as is up to date.  GC/Chlamydia obtained   +FHT present      Recent Labs   Lab Test  21  1238 20  1144   ABO  --  O   RH  --  Pos   AS Negative  --      Rhogam not indicated     Recent Labs   Lab Test 21  1238 20  1420   HEPBANG Nonreactive  --    HIAGAB Nonreactive  --    GBS  --  Negative   RUQIGG Positive*  --        Treponemal antibody neg    CBC RESULTS:   Recent Labs   Lab Test 21  1238   WBC 16.3*   RBC 4.38   HGB 13.7   HCT 38.9   MCV 89   MCH 31.3   MCHC 35.2   RDW 12.5          ASSESSMENT:  31 year old  at 14w5d dated by early U/S here for NOB visit.    PLAN:    1)Prenatal labs reviewed. She has no questions.  2) EDUCATION : RECOMMENDED WEIGHT GAIN: 25-35 lbs given Body mass index is 24.2 kg/m ..   - Instructed on best evidence for: healthy diet and foods to avoid; exercise and activity during pregnancy; and maintenance of a generally healthy lifestyle. Reviewed early pregnancy education, provider coverage, labor and delivery, and prenatal visits.  Discussed the harms, benefits, side effects and alternative therapies for current prescribed and OTC medications.  - recommend PNV  3) Discussed options for screening for chromosomal anomalies, including first screen, noninvasive prenatal testing, CVS/amniocentesis, quad screen, and ultrasound at 18-20 weeks. She is electing ultrasound at 18-20 weeks.  4) COVID unvaccinated.  Discussed and encouraged but declines at present.  Had mild clinical infection 2022    Follow up in 4 weeks with U/S.. She is encouraged to call sooner with questions or concerns.    Nico Mathias MD   Obstetrics and Gynecology

## 2022-02-05 LAB
C TRACH DNA SPEC QL NAA+PROBE: NEGATIVE
N GONORRHOEA DNA SPEC QL NAA+PROBE: NEGATIVE

## 2022-03-10 ENCOUNTER — ANCILLARY PROCEDURE (OUTPATIENT)
Dept: ULTRASOUND IMAGING | Facility: CLINIC | Age: 32
End: 2022-03-10
Attending: OBSTETRICS & GYNECOLOGY
Payer: COMMERCIAL

## 2022-03-10 ENCOUNTER — TRANSCRIBE ORDERS (OUTPATIENT)
Dept: MATERNAL FETAL MEDICINE | Facility: CLINIC | Age: 32
End: 2022-03-10

## 2022-03-10 ENCOUNTER — PRENATAL OFFICE VISIT (OUTPATIENT)
Dept: OBGYN | Facility: CLINIC | Age: 32
End: 2022-03-10
Attending: OBSTETRICS & GYNECOLOGY
Payer: COMMERCIAL

## 2022-03-10 VITALS
WEIGHT: 136 LBS | HEIGHT: 62 IN | DIASTOLIC BLOOD PRESSURE: 70 MMHG | BODY MASS INDEX: 25.03 KG/M2 | SYSTOLIC BLOOD PRESSURE: 108 MMHG

## 2022-03-10 DIAGNOSIS — Z34.80 SUPERVISION OF OTHER NORMAL PREGNANCY, ANTEPARTUM: Primary | ICD-10-CM

## 2022-03-10 DIAGNOSIS — Z34.82 NORMAL PREGNANCY IN MULTIGRAVIDA IN SECOND TRIMESTER: ICD-10-CM

## 2022-03-10 DIAGNOSIS — O26.90 PREGNANCY RELATED CONDITION, ANTEPARTUM: Primary | ICD-10-CM

## 2022-03-10 PROCEDURE — 99207 PR PRENATAL VISIT: CPT | Performed by: OBSTETRICS & GYNECOLOGY

## 2022-03-10 PROCEDURE — 76805 OB US >/= 14 WKS SNGL FETUS: CPT | Performed by: OBSTETRICS & GYNECOLOGY

## 2022-03-10 NOTE — PROGRESS NOTES
IUP at 19w4d,      Supervision of normal pregnancy  1) Anatomy scan today w/missing profile view and MI. Plan f/u in MFM for further evaluation of cord insert and to obtain missing views. Will anticipate growth ultrasounds due to marginal CI and covid infection this pregnancy  2) Covid: not vaccinated, s/p infection 2022. Encouraged vaccination.    Return to clinic 4w.     Aysha Montilla MD

## 2022-03-10 NOTE — NURSING NOTE
"Chief Complaint   Patient presents with     Prenatal Care     19 4/7 weeks       Initial /70 (BP Location: Left arm, Patient Position: Chair, Cuff Size: Adult Regular)   Ht 1.562 m (5' 1.5\")   Wt 61.7 kg (136 lb)   Breastfeeding No   BMI 25.28 kg/m   Estimated body mass index is 25.28 kg/m  as calculated from the following:    Height as of this encounter: 1.562 m (5' 1.5\").    Weight as of this encounter: 61.7 kg (136 lb).  BP completed using cuff size: regular    Questioned patient about current smoking habits.  Pt. has never smoked.          The following HM Due: NONE  +fetal movement    Marcy Lima, CMA    "
98

## 2022-03-24 ENCOUNTER — PRE VISIT (OUTPATIENT)
Dept: MATERNAL FETAL MEDICINE | Facility: CLINIC | Age: 32
End: 2022-03-24
Payer: COMMERCIAL

## 2022-04-01 ENCOUNTER — HOSPITAL ENCOUNTER (OUTPATIENT)
Dept: ULTRASOUND IMAGING | Facility: CLINIC | Age: 32
Discharge: HOME | End: 2022-04-01
Attending: OBSTETRICS & GYNECOLOGY
Payer: COMMERCIAL

## 2022-04-01 ENCOUNTER — OFFICE VISIT (OUTPATIENT)
Dept: MATERNAL FETAL MEDICINE | Facility: CLINIC | Age: 32
End: 2022-04-01
Attending: OBSTETRICS & GYNECOLOGY
Payer: COMMERCIAL

## 2022-04-01 DIAGNOSIS — U07.1 COVID-19 AFFECTING PREGNANCY IN FIRST TRIMESTER: Primary | ICD-10-CM

## 2022-04-01 DIAGNOSIS — O98.511 COVID-19 AFFECTING PREGNANCY IN FIRST TRIMESTER: Primary | ICD-10-CM

## 2022-04-01 DIAGNOSIS — Z03.73 ENCOUNTER FOR OBSERVATION OF SUSPECTED FETAL ANOMALY NOT FOUND: ICD-10-CM

## 2022-04-01 DIAGNOSIS — O26.90 PREGNANCY RELATED CONDITION, ANTEPARTUM: ICD-10-CM

## 2022-04-01 PROCEDURE — 76811 OB US DETAILED SNGL FETUS: CPT

## 2022-04-01 PROCEDURE — 76811 OB US DETAILED SNGL FETUS: CPT | Mod: 26 | Performed by: OBSTETRICS & GYNECOLOGY

## 2022-04-01 NOTE — PROGRESS NOTES
Please refer to ultrasound report under 'Imaging' Studies of 'Chart Review' tabs.    Jhony Alarcon M.D.

## 2022-04-07 ENCOUNTER — PRENATAL OFFICE VISIT (OUTPATIENT)
Dept: OBGYN | Facility: CLINIC | Age: 32
End: 2022-04-07
Payer: COMMERCIAL

## 2022-04-07 VITALS
BODY MASS INDEX: 26.13 KG/M2 | WEIGHT: 142 LBS | HEIGHT: 62 IN | DIASTOLIC BLOOD PRESSURE: 60 MMHG | SYSTOLIC BLOOD PRESSURE: 110 MMHG

## 2022-04-07 DIAGNOSIS — Z34.80 SUPERVISION OF OTHER NORMAL PREGNANCY, ANTEPARTUM: Primary | ICD-10-CM

## 2022-04-07 DIAGNOSIS — U07.1 COVID-19 AFFECTING PREGNANCY IN SECOND TRIMESTER: ICD-10-CM

## 2022-04-07 DIAGNOSIS — O98.512 COVID-19 AFFECTING PREGNANCY IN SECOND TRIMESTER: ICD-10-CM

## 2022-04-07 PROCEDURE — 99207 PR PRENATAL VISIT: CPT | Performed by: OBSTETRICS & GYNECOLOGY

## 2022-04-07 NOTE — NURSING NOTE
"Chief Complaint   Patient presents with     Prenatal Care     23 4/7 weeks       Initial /60 (BP Location: Left arm, Patient Position: Chair, Cuff Size: Adult Regular)   Ht 1.562 m (5' 1.5\")   Wt 64.4 kg (142 lb)   Breastfeeding No   BMI 26.40 kg/m   Estimated body mass index is 26.4 kg/m  as calculated from the following:    Height as of this encounter: 1.562 m (5' 1.5\").    Weight as of this encounter: 64.4 kg (142 lb).  BP completed using cuff size: regular    Questioned patient about current smoking habits.  Pt. has never smoked.          The following HM Due: NONE    +fetal movement  -swelling  -headaches  Marcy Lima, CMA      "

## 2022-04-07 NOTE — PROGRESS NOTES
IUP at 23w4d,      Supervision of normal pregnancy  1) Reviewed kick counts and travel precautions  2) Covid: not vaccinated, s/p infection 2022. S/p level II. Plan growth in our clinic at 32w. Encouraged vaccination.    Return to clinic 4w. GCT/CBC/Tdap at that visit.    Aysha Montilla MD    

## 2022-05-19 ENCOUNTER — PRENATAL OFFICE VISIT (OUTPATIENT)
Dept: OBGYN | Facility: CLINIC | Age: 32
End: 2022-05-19
Payer: COMMERCIAL

## 2022-05-19 VITALS
WEIGHT: 144 LBS | BODY MASS INDEX: 26.5 KG/M2 | SYSTOLIC BLOOD PRESSURE: 116 MMHG | HEIGHT: 62 IN | DIASTOLIC BLOOD PRESSURE: 70 MMHG

## 2022-05-19 DIAGNOSIS — Z34.80 SUPERVISION OF OTHER NORMAL PREGNANCY, ANTEPARTUM: Primary | ICD-10-CM

## 2022-05-19 LAB
ERYTHROCYTE [DISTWIDTH] IN BLOOD BY AUTOMATED COUNT: 12.9 % (ref 10–15)
GLUCOSE 1H P 50 G GLC PO SERPL-MCNC: 108 MG/DL (ref 70–129)
HCT VFR BLD AUTO: 35.4 % (ref 35–47)
HGB BLD-MCNC: 12.6 G/DL (ref 11.7–15.7)
MCH RBC QN AUTO: 31.7 PG (ref 26.5–33)
MCHC RBC AUTO-ENTMCNC: 35.6 G/DL (ref 31.5–36.5)
MCV RBC AUTO: 89 FL (ref 78–100)
PLATELET # BLD AUTO: 195 10E3/UL (ref 150–450)
RBC # BLD AUTO: 3.97 10E6/UL (ref 3.8–5.2)
WBC # BLD AUTO: 17.4 10E3/UL (ref 4–11)

## 2022-05-19 PROCEDURE — 85027 COMPLETE CBC AUTOMATED: CPT | Performed by: OBSTETRICS & GYNECOLOGY

## 2022-05-19 PROCEDURE — 82950 GLUCOSE TEST: CPT | Performed by: OBSTETRICS & GYNECOLOGY

## 2022-05-19 PROCEDURE — 36415 COLL VENOUS BLD VENIPUNCTURE: CPT | Performed by: OBSTETRICS & GYNECOLOGY

## 2022-05-19 PROCEDURE — 86780 TREPONEMA PALLIDUM: CPT | Performed by: OBSTETRICS & GYNECOLOGY

## 2022-05-19 PROCEDURE — 99207 PR PRENATAL VISIT: CPT | Performed by: OBSTETRICS & GYNECOLOGY

## 2022-05-19 NOTE — NURSING NOTE
"Chief Complaint   Patient presents with     Prenatal Care     29 4/7 weeks       Initial /70 (BP Location: Right arm, Patient Position: Chair, Cuff Size: Adult Regular)   Ht 1.562 m (5' 1.5\")   Wt 65.3 kg (144 lb)   Breastfeeding No   BMI 26.77 kg/m   Estimated body mass index is 26.77 kg/m  as calculated from the following:    Height as of this encounter: 1.562 m (5' 1.5\").    Weight as of this encounter: 65.3 kg (144 lb).  BP completed using cuff size: regular    Questioned patient about current smoking habits.  Pt. has never smoked.          The following HM Due: NONE    +fetal movement  -swelling    Marcy Lima, CMA    "

## 2022-05-19 NOTE — PROGRESS NOTES
IUP at 29w4d,      Supervision of normal pregnancy  1) Reviewed kick counts and travel precautions  2) Covid: not vaccinated, s/p infection 2022. S/p level II. Plan growth in our clinic at 34w. Encouraged vaccination.    Return to clinic 4w. GCT/CBC today. Tdap next visit    Aysha Montilla MD

## 2022-05-20 LAB — T PALLIDUM AB SER QL: NONREACTIVE

## 2022-06-13 ENCOUNTER — PRENATAL OFFICE VISIT (OUTPATIENT)
Dept: OBGYN | Facility: CLINIC | Age: 32
End: 2022-06-13
Payer: COMMERCIAL

## 2022-06-13 VITALS — DIASTOLIC BLOOD PRESSURE: 68 MMHG | BODY MASS INDEX: 27.92 KG/M2 | SYSTOLIC BLOOD PRESSURE: 114 MMHG | WEIGHT: 150.2 LBS

## 2022-06-13 DIAGNOSIS — Z34.80 SUPERVISION OF OTHER NORMAL PREGNANCY, ANTEPARTUM: Primary | ICD-10-CM

## 2022-06-13 DIAGNOSIS — U07.1 COVID-19 AFFECTING PREGNANCY IN THIRD TRIMESTER: ICD-10-CM

## 2022-06-13 DIAGNOSIS — O98.513 COVID-19 AFFECTING PREGNANCY IN THIRD TRIMESTER: ICD-10-CM

## 2022-06-13 PROCEDURE — 99207 PR PRENATAL VISIT: CPT | Performed by: OBSTETRICS & GYNECOLOGY

## 2022-06-13 NOTE — PROGRESS NOTES
IUP at 33w1d,    +FM, no vaginal bleeding, occasional BH contractions.    Supervision of normal pregnancy  1) No concerns today.  2) Covid: not vaccinated, s/p infection 2022. S/p level II. Plan growth in our clinic at 34-35w. Encouraged vaccination.    Declines Tdap. Counseled on vaccination w/every pregnancy and vaccination of close contacts.    Return to clinic 2-3w.     Aysha Montilla MD

## 2022-06-13 NOTE — NURSING NOTE
"Chief Complaint   Patient presents with     Prenatal Care     33 weeks 1 day, no c/o VB, LoF, cramping/contractions. Feeling FM daily. No questions or concerns. Declines tdap vaccine today       Initial /68   Wt 68.1 kg (150 lb 3.2 oz)   BMI 27.92 kg/m   Estimated body mass index is 27.92 kg/m  as calculated from the following:    Height as of 22: 1.562 m (5' 1.5\").    Weight as of this encounter: 68.1 kg (150 lb 3.2 oz).  BP completed using cuff size: regular    Questioned patient about current smoking habits.  Pt. has never smoked.          The following HM Due: NONE      Monisha Perera CMA               "

## 2022-06-13 NOTE — PATIENT INSTRUCTIONS
Phone numbers Bee:  Day/ night [phone] ask for OB triage  Emergency:  Call labor and delivery:  [phone]     Call right away with any of the following concerns:    1.   Regular, painful contractions every 5 minutes that make it difficult to walk or talk for over approximately one hour's time.    2.   Vaginal bleeding more than just a spot or 2 on the toilet paper.    3.   Leaking fluid of any amount, or suspicion of ruptured membranes    4.   Decreased movement of your baby.      Continue your prenatal vitamins daily.    Avoid lying flat on your back for a prolonged period.    Consume 2-3 servings of fish or seafood weekly for fetal brain development. Avoid shark, tilefish, swordfish, and albacore tune as these contain very charli levels of mercury. No raw seafood in pregnancy.    Be sure you are consuming 1000mg of calcium and 1000 IU of vitamin D daily.    Unless instructed otherwise, try to fit in 30 minutes of moderate exercise daily.    Please call with any additional concerns that your have, and continue your prenatal visits weekly.    You may complete your hospital pre-registration online at [email or web address].       [facility] Birthplace Virtual Tour link:    [email or web address]      [facility] Prenatal, Pregnancy, and Postpartum online education  [email or web address]    [email or web address]

## 2022-06-23 ENCOUNTER — ANCILLARY PROCEDURE (OUTPATIENT)
Dept: ULTRASOUND IMAGING | Facility: CLINIC | Age: 32
End: 2022-06-23
Attending: OBSTETRICS & GYNECOLOGY
Payer: COMMERCIAL

## 2022-06-23 PROCEDURE — 76819 FETAL BIOPHYS PROFIL W/O NST: CPT | Performed by: OBSTETRICS & GYNECOLOGY

## 2022-06-23 PROCEDURE — 76816 OB US FOLLOW-UP PER FETUS: CPT | Performed by: OBSTETRICS & GYNECOLOGY

## 2022-07-07 ENCOUNTER — PRENATAL OFFICE VISIT (OUTPATIENT)
Dept: OBGYN | Facility: CLINIC | Age: 32
End: 2022-07-07
Payer: COMMERCIAL

## 2022-07-07 VITALS
DIASTOLIC BLOOD PRESSURE: 72 MMHG | WEIGHT: 152.6 LBS | BODY MASS INDEX: 28.37 KG/M2 | SYSTOLIC BLOOD PRESSURE: 114 MMHG | HEART RATE: 106 BPM

## 2022-07-07 DIAGNOSIS — Z34.80 SUPERVISION OF OTHER NORMAL PREGNANCY, ANTEPARTUM: Primary | ICD-10-CM

## 2022-07-07 DIAGNOSIS — O98.513 COVID-19 AFFECTING PREGNANCY IN THIRD TRIMESTER: ICD-10-CM

## 2022-07-07 DIAGNOSIS — U07.1 COVID-19 AFFECTING PREGNANCY IN THIRD TRIMESTER: ICD-10-CM

## 2022-07-07 PROCEDURE — 99207 PR PRENATAL VISIT: CPT | Performed by: OBSTETRICS & GYNECOLOGY

## 2022-07-07 PROCEDURE — 87653 STREP B DNA AMP PROBE: CPT | Performed by: OBSTETRICS & GYNECOLOGY

## 2022-07-07 NOTE — PROGRESS NOTES
Doing well.   No complaints.   Denies VB, ctx, LOF. +FM  /72 (BP Location: Right arm, Cuff Size: Adult Regular)   Pulse 106   Wt 69.2 kg (152 lb 9.6 oz)   Breastfeeding No   BMI 28.37 kg/m    General Appearance: NAD  Abdomen: Gravid, NT  Refer to flow sheet above.   A/P: 31 year old  at 36w4d  -- GBS collected today  -- PTL precautions reviewed  RTC in 1 week     Windy Scanlon MD  [facility]

## 2022-07-07 NOTE — NURSING NOTE
"Chief Complaint   Patient presents with     Prenatal Care     36 weeks 4 days   GBS due   Tdap decline        Initial /72 (BP Location: Right arm, Cuff Size: Adult Regular)   Pulse 106   Wt 69.2 kg (152 lb 9.6 oz)   Breastfeeding No   BMI 28.37 kg/m   Estimated body mass index is 28.37 kg/m  as calculated from the following:    Height as of 22: 1.562 m (5' 1.5\").    Weight as of this encounter: 69.2 kg (152 lb 9.6 oz).  BP completed using cuff size: regular    Questioned patient about current smoking habits.  Pt. has never smoked.    36w4d      The following HM Due: NONE        +FM daily   +GBS due   +Contractions        Kennedi Morin, CMA on 2022 at 10:11 AM             "

## 2022-07-08 LAB — GP B STREP DNA SPEC QL NAA+PROBE: NEGATIVE

## 2022-07-14 ENCOUNTER — TELEPHONE (OUTPATIENT)
Dept: OBGYN | Facility: CLINIC | Age: 32
End: 2022-07-14

## 2022-07-14 ENCOUNTER — PRENATAL OFFICE VISIT (OUTPATIENT)
Dept: OBGYN | Facility: CLINIC | Age: 32
End: 2022-07-14
Payer: COMMERCIAL

## 2022-07-14 VITALS
HEIGHT: 62 IN | BODY MASS INDEX: 28.19 KG/M2 | DIASTOLIC BLOOD PRESSURE: 68 MMHG | WEIGHT: 153.2 LBS | SYSTOLIC BLOOD PRESSURE: 110 MMHG

## 2022-07-14 DIAGNOSIS — Z34.80 SUPERVISION OF OTHER NORMAL PREGNANCY, ANTEPARTUM: Primary | ICD-10-CM

## 2022-07-14 PROCEDURE — 99207 PR PRENATAL VISIT: CPT | Performed by: OBSTETRICS & GYNECOLOGY

## 2022-07-14 NOTE — NURSING NOTE
"37w4d    Chief Complaint   Patient presents with     Prenatal Care     GBS neg       Initial /68   Ht 1.562 m (5' 1.5\")   Wt 69.5 kg (153 lb 3.2 oz)   BMI 28.48 kg/m   Estimated body mass index is 28.48 kg/m  as calculated from the following:    Height as of this encounter: 1.562 m (5' 1.5\").    Weight as of this encounter: 69.5 kg (153 lb 3.2 oz).  BP completed using cuff size: regular    Questioned patient about current smoking habits.  Pt. has never smoked.          The following HM Due: NONE  "

## 2022-07-14 NOTE — PROGRESS NOTES
Emily Linares is a 31 year old female, 37w4d, Estimated Date of Delivery: Jul 31, 2022 who presents for prenatal care.  Good fetal movement.  Patient is doing well without concerns.  I reviewed the results of her GBS culture.  Patient declines a cervical check today    A/P: Intrauterine pregnancy 37-4/7 weeks gestation doing well.  Signs and symptoms of labor and concern discussed the patient will follow

## 2022-07-14 NOTE — TELEPHONE ENCOUNTER
Form received from: Patient    Form requesting following info/need: STD    LETI needed?: No    Location of form: Rhonda's desk    When completed the route for return: Fax [phone]

## 2022-07-14 NOTE — PATIENT INSTRUCTIONS
You can reach your [facility] Care Team any time of the day by calling [phone]. This number will put you in touch with the 24 hour nurse line if the clinic is closed.    To contact your OB/GYN Station Coordinator/Surgery Scheduler please call [phone]. This is a direct number for your care team between 8 a.m. and 4 p.m. Monday through Friday.    [facility] is open for your convenience:  Monday through Friday 8 a.m. to 6 p.m.  Closed weekends and all major holidays.

## 2022-07-18 ENCOUNTER — PRENATAL OFFICE VISIT (OUTPATIENT)
Dept: MIDWIFE SERVICES | Facility: CLINIC | Age: 32
End: 2022-07-18
Payer: COMMERCIAL

## 2022-07-18 VITALS
HEIGHT: 62 IN | DIASTOLIC BLOOD PRESSURE: 70 MMHG | WEIGHT: 151.3 LBS | SYSTOLIC BLOOD PRESSURE: 118 MMHG | BODY MASS INDEX: 27.84 KG/M2

## 2022-07-18 DIAGNOSIS — Z34.80 SUPERVISION OF OTHER NORMAL PREGNANCY, ANTEPARTUM: Primary | ICD-10-CM

## 2022-07-18 PROCEDURE — 99207 PR PRENATAL VISIT: CPT | Performed by: ADVANCED PRACTICE MIDWIFE

## 2022-07-18 NOTE — NURSING NOTE
"38w1d    Chief Complaint   Patient presents with     Prenatal Care     Noticed more contractions on  than usual--ended up stopping--discuss visitor protocol after delivery       Initial /70   Ht 1.562 m (5' 1.5\")   Wt 68.6 kg (151 lb 4.8 oz)   BMI 28.12 kg/m   Estimated body mass index is 28.12 kg/m  as calculated from the following:    Height as of this encounter: 1.562 m (5' 1.5\").    Weight as of this encounter: 68.6 kg (151 lb 4.8 oz).  BP completed using cuff size: regular    Questioned patient about current smoking habits.  Pt. has never smoked.          The following HM Due: NONE         "

## 2022-07-18 NOTE — PATIENT INSTRUCTIONS
Weeks 37 thru 40 - Gestational Age (Fetal Age - Weeks 35 thru 38):  At 38 weeks the fetus is considered full term and is ready to make its appearance at any time. As your baby becomes bigger, you may notice a change in fetal movement. If you notice a decrease in fetal movement, make sure to talk with your doctor. The fingernails have grown long and will need to be cut soon after birth. Small breast buds are present on both sexes. The mother is supplying the fetus with antibodies that will help protect against disease. All organs are developed, with the lungs maturing all the way until the day of delivery. The fetus is about 19 - 21 inches in length and weighs anywhere from 6   lbs to 10 lbs.

## 2022-07-18 NOTE — PROGRESS NOTES
"S: Feels good! Last weekend she felt some contractions [place] but they resolved once she got home and was moving around again. She had spent the day in the sun so she is thinking she may have been dehydrated. Baby active. Denies uterine cramping, vaginal bleeding or leaking of fluid. No headache, increase in edema, no epigastric pain.     O: Vitals: /70   Ht 1.562 m (5' 1.5\")   Wt 68.6 kg (151 lb 4.8 oz)   BMI 28.12 kg/m    BMI= Body mass index is 28.12 kg/m .  Exam:  Constitutional: healthy, alert and no distress  Respiratory: respirations even and unlabored  Gastrointestinal: Abdomen soft, non-tender. Fundus measures appropriate for gestational age. Fetal heart tones hear without difficulty and within normal limits  : Deferred  Psychiatric: mentation appears normal and affect normal/bright  A:     ICD-10-CM    1. Supervision of other normal pregnancy, antepartum  Z34.80      P: Labor signs and symptoms discussed, aware of numbers to call  Discussed warning signs of PIH/preeclampsia and patient will monitor.  GBS screen completed. Discussed plans for labor.   Encouraged patient to call with any questions or concerns.  Return to clinic 1 weeks     KIKE Pak    I was present with the student who participated in the service and documentation of the services provided. I have verified the history and personally performed the physical exam and medical decision making as documented by the student and edited by me.  ELVIRA Nunez CNM 7/18/2022 2:47 PM      "

## 2022-07-19 NOTE — TELEPHONE ENCOUNTER
Patient has appt with LC on Monday 07/25/22. Will have him sign the forms on behalf of AB then. Forms put next to [employer] with note on them regarding forms.   Monisha Perera CMA

## 2022-07-25 ENCOUNTER — PRENATAL OFFICE VISIT (OUTPATIENT)
Dept: OBGYN | Facility: CLINIC | Age: 32
End: 2022-07-25
Payer: COMMERCIAL

## 2022-07-25 VITALS — BODY MASS INDEX: 28.63 KG/M2 | WEIGHT: 154 LBS | DIASTOLIC BLOOD PRESSURE: 62 MMHG | SYSTOLIC BLOOD PRESSURE: 114 MMHG

## 2022-07-25 DIAGNOSIS — O98.512 COVID-19 AFFECTING PREGNANCY IN SECOND TRIMESTER: Primary | ICD-10-CM

## 2022-07-25 DIAGNOSIS — U07.1 COVID-19 AFFECTING PREGNANCY IN SECOND TRIMESTER: Primary | ICD-10-CM

## 2022-07-25 PROBLEM — Z28.39 RUBELLA NON-IMMUNE STATUS, ANTEPARTUM: Status: RESOLVED | Noted: 2019-12-02 | Resolved: 2022-07-25

## 2022-07-25 PROBLEM — O09.899 RUBELLA NON-IMMUNE STATUS, ANTEPARTUM: Status: RESOLVED | Noted: 2019-12-02 | Resolved: 2022-07-25

## 2022-07-25 PROBLEM — Z34.80 SUPERVISION OF OTHER NORMAL PREGNANCY, ANTEPARTUM: Status: RESOLVED | Noted: 2019-11-26 | Resolved: 2022-07-25

## 2022-07-25 PROCEDURE — 59426 ANTEPARTUM CARE ONLY: CPT | Performed by: OBSTETRICS & GYNECOLOGY

## 2022-07-25 PROCEDURE — 99207 PR COMPLICATED OB VISIT: CPT | Performed by: OBSTETRICS & GYNECOLOGY

## 2022-07-25 NOTE — TELEPHONE ENCOUNTER
Forms signed by ALONA and faxed back. Patient given a copy of the forms, and forms sent to abstracting.   Monisha Perera CMA

## 2022-07-25 NOTE — NURSING NOTE
"Chief Complaint   Patient presents with     Prenatal Care     39 weeks 1 day, no c/o VB, LoF, cramping. Patient has been having some contractions. Feeling FM several times a day. No questions or concerns.   Patient has forms completed that just need signed.        Initial /62   Wt 69.9 kg (154 lb)   BMI 28.63 kg/m   Estimated body mass index is 28.63 kg/m  as calculated from the following:    Height as of 22: 1.562 m (5' 1.5\").    Weight as of this encounter: 69.9 kg (154 lb).  BP completed using cuff size: regular    Questioned patient about current smoking habits.  Pt. has never smoked.          The following HM Due: NONE    Monisha Perera CMA               "

## 2022-07-25 NOTE — PROGRESS NOTES
No c/o's.  Declined Cx check.  U/S at 34 wks 16%.  Fetal movement counts BID, rupture of membranes/labor precautions reviewed.  RTC 1 week(s).    Encounter Diagnosis   Name Primary?     COVID-19 affecting pregnancy in second trimester Yes       Risk factors listed above are stable and being addressed as noted.    Nomi Lala MD  [facility] [place]

## 2022-07-29 ENCOUNTER — NURSE TRIAGE (OUTPATIENT)
Dept: NURSING | Facility: CLINIC | Age: 32
End: 2022-07-29

## 2022-07-30 ENCOUNTER — HOSPITAL ENCOUNTER (INPATIENT)
Facility: CLINIC | Age: 32
LOS: 1 days | Discharge: HOME HEALTH | End: 2022-07-31
Attending: FAMILY MEDICINE | Admitting: OBSTETRICS & GYNECOLOGY
Payer: COMMERCIAL

## 2022-07-30 ENCOUNTER — NURSE TRIAGE (OUTPATIENT)
Dept: NURSING | Facility: CLINIC | Age: 32
End: 2022-07-30

## 2022-07-30 LAB
ABO/RH(D): NORMAL
ANTIBODY SCREEN: NEGATIVE
BASOPHILS # BLD AUTO: 0.1 10E3/UL (ref 0–0.2)
BASOPHILS NFR BLD AUTO: 0 %
EOSINOPHIL # BLD AUTO: 0.1 10E3/UL (ref 0–0.7)
EOSINOPHIL NFR BLD AUTO: 0 %
ERYTHROCYTE [DISTWIDTH] IN BLOOD BY AUTOMATED COUNT: 13.2 % (ref 10–15)
HCT VFR BLD AUTO: 42.8 % (ref 35–47)
HGB BLD-MCNC: 14.5 G/DL (ref 11.7–15.7)
IMM GRANULOCYTES # BLD: 0.6 10E3/UL
IMM GRANULOCYTES NFR BLD: 2 %
LYMPHOCYTES # BLD AUTO: 1.9 10E3/UL (ref 0.8–5.3)
LYMPHOCYTES NFR BLD AUTO: 7 %
MCH RBC QN AUTO: 31.5 PG (ref 26.5–33)
MCHC RBC AUTO-ENTMCNC: 33.9 G/DL (ref 31.5–36.5)
MCV RBC AUTO: 93 FL (ref 78–100)
MONOCYTES # BLD AUTO: 1 10E3/UL (ref 0–1.3)
MONOCYTES NFR BLD AUTO: 4 %
NEUTROPHILS # BLD AUTO: 24.8 10E3/UL (ref 1.6–8.3)
NEUTROPHILS NFR BLD AUTO: 87 %
NRBC # BLD AUTO: 0 10E3/UL
NRBC BLD AUTO-RTO: 0 /100
PLATELET # BLD AUTO: 179 10E3/UL (ref 150–450)
RBC # BLD AUTO: 4.6 10E6/UL (ref 3.8–5.2)
SARS-COV-2 RNA RESP QL NAA+PROBE: NEGATIVE
SPECIMEN EXPIRATION DATE: NORMAL
T PALLIDUM AB SER QL: NONREACTIVE
WBC # BLD AUTO: 28.4 10E3/UL (ref 4–11)

## 2022-07-30 PROCEDURE — 120N000001 HC R&B MED SURG/OB

## 2022-07-30 PROCEDURE — 85025 COMPLETE CBC W/AUTO DIFF WBC: CPT | Performed by: OBSTETRICS & GYNECOLOGY

## 2022-07-30 PROCEDURE — 86850 RBC ANTIBODY SCREEN: CPT | Performed by: OBSTETRICS & GYNECOLOGY

## 2022-07-30 PROCEDURE — 36415 COLL VENOUS BLD VENIPUNCTURE: CPT | Performed by: OBSTETRICS & GYNECOLOGY

## 2022-07-30 PROCEDURE — 250N000009 HC RX 250

## 2022-07-30 PROCEDURE — 722N000001 HC LABOR CARE VAGINAL DELIVERY SINGLE

## 2022-07-30 PROCEDURE — 250N000011 HC RX IP 250 OP 636

## 2022-07-30 PROCEDURE — U0003 INFECTIOUS AGENT DETECTION BY NUCLEIC ACID (DNA OR RNA); SEVERE ACUTE RESPIRATORY SYNDROME CORONAVIRUS 2 (SARS-COV-2) (CORONAVIRUS DISEASE [COVID-19]), AMPLIFIED PROBE TECHNIQUE, MAKING USE OF HIGH THROUGHPUT TECHNOLOGIES AS DESCRIBED BY CMS-2020-01-R: HCPCS | Performed by: OBSTETRICS & GYNECOLOGY

## 2022-07-30 PROCEDURE — 250N000011 HC RX IP 250 OP 636: Performed by: OBSTETRICS & GYNECOLOGY

## 2022-07-30 PROCEDURE — 0UQMXZZ REPAIR VULVA, EXTERNAL APPROACH: ICD-10-PCS | Performed by: OBSTETRICS & GYNECOLOGY

## 2022-07-30 PROCEDURE — 250N000013 HC RX MED GY IP 250 OP 250 PS 637: Performed by: OBSTETRICS & GYNECOLOGY

## 2022-07-30 PROCEDURE — 96372 THER/PROPH/DIAG INJ SC/IM: CPT | Performed by: OBSTETRICS & GYNECOLOGY

## 2022-07-30 PROCEDURE — 86780 TREPONEMA PALLIDUM: CPT | Performed by: OBSTETRICS & GYNECOLOGY

## 2022-07-30 RX ORDER — OXYTOCIN/0.9 % SODIUM CHLORIDE 30/500 ML
PLASTIC BAG, INJECTION (ML) INTRAVENOUS
Status: DISCONTINUED
Start: 2022-07-30 | End: 2022-07-30 | Stop reason: WASHOUT

## 2022-07-30 RX ORDER — DOCUSATE SODIUM 100 MG/1
100 CAPSULE, LIQUID FILLED ORAL DAILY
Status: DISCONTINUED | OUTPATIENT
Start: 2022-07-30 | End: 2022-07-31 | Stop reason: HOSPADM

## 2022-07-30 RX ORDER — OXYTOCIN/0.9 % SODIUM CHLORIDE 30/500 ML
340 PLASTIC BAG, INJECTION (ML) INTRAVENOUS CONTINUOUS PRN
Status: DISCONTINUED | OUTPATIENT
Start: 2022-07-30 | End: 2022-07-31 | Stop reason: HOSPADM

## 2022-07-30 RX ORDER — BISACODYL 10 MG
10 SUPPOSITORY, RECTAL RECTAL DAILY PRN
Status: DISCONTINUED | OUTPATIENT
Start: 2022-07-30 | End: 2022-07-31 | Stop reason: HOSPADM

## 2022-07-30 RX ORDER — HYDROCORTISONE 25 MG/G
CREAM TOPICAL 3 TIMES DAILY PRN
Status: DISCONTINUED | OUTPATIENT
Start: 2022-07-30 | End: 2022-07-31 | Stop reason: HOSPADM

## 2022-07-30 RX ORDER — TRANEXAMIC ACID 10 MG/ML
1 INJECTION, SOLUTION INTRAVENOUS EVERY 30 MIN PRN
Status: DISCONTINUED | OUTPATIENT
Start: 2022-07-30 | End: 2022-07-31 | Stop reason: HOSPADM

## 2022-07-30 RX ORDER — MISOPROSTOL 200 UG/1
400 TABLET ORAL
Status: DISCONTINUED | OUTPATIENT
Start: 2022-07-30 | End: 2022-07-31 | Stop reason: HOSPADM

## 2022-07-30 RX ORDER — CARBOPROST TROMETHAMINE 250 UG/ML
250 INJECTION, SOLUTION INTRAMUSCULAR
Status: DISCONTINUED | OUTPATIENT
Start: 2022-07-30 | End: 2022-07-31 | Stop reason: HOSPADM

## 2022-07-30 RX ORDER — METHYLERGONOVINE MALEATE 0.2 MG/ML
200 INJECTION INTRAVENOUS ONCE
Status: COMPLETED | OUTPATIENT
Start: 2022-07-30 | End: 2022-07-30

## 2022-07-30 RX ORDER — IBUPROFEN 800 MG/1
800 TABLET, FILM COATED ORAL EVERY 6 HOURS PRN
Status: DISCONTINUED | OUTPATIENT
Start: 2022-07-30 | End: 2022-07-31 | Stop reason: HOSPADM

## 2022-07-30 RX ORDER — ACETAMINOPHEN 325 MG/1
650 TABLET ORAL EVERY 4 HOURS PRN
Status: DISCONTINUED | OUTPATIENT
Start: 2022-07-30 | End: 2022-07-31 | Stop reason: HOSPADM

## 2022-07-30 RX ORDER — LIDOCAINE HYDROCHLORIDE 10 MG/ML
INJECTION, SOLUTION EPIDURAL; INFILTRATION; INTRACAUDAL; PERINEURAL
Status: COMPLETED
Start: 2022-07-30 | End: 2022-07-30

## 2022-07-30 RX ORDER — MODIFIED LANOLIN
OINTMENT (GRAM) TOPICAL
Status: DISCONTINUED | OUTPATIENT
Start: 2022-07-30 | End: 2022-07-31 | Stop reason: HOSPADM

## 2022-07-30 RX ORDER — METHYLERGONOVINE MALEATE 0.2 MG/ML
200 INJECTION INTRAVENOUS
Status: DISCONTINUED | OUTPATIENT
Start: 2022-07-30 | End: 2022-07-31 | Stop reason: HOSPADM

## 2022-07-30 RX ORDER — OXYTOCIN 10 [USP'U]/ML
INJECTION, SOLUTION INTRAMUSCULAR; INTRAVENOUS
Status: COMPLETED
Start: 2022-07-30 | End: 2022-07-30

## 2022-07-30 RX ORDER — MISOPROSTOL 200 UG/1
800 TABLET ORAL
Status: DISCONTINUED | OUTPATIENT
Start: 2022-07-30 | End: 2022-07-31 | Stop reason: HOSPADM

## 2022-07-30 RX ORDER — OXYTOCIN 10 [USP'U]/ML
10 INJECTION, SOLUTION INTRAMUSCULAR; INTRAVENOUS
Status: DISCONTINUED | OUTPATIENT
Start: 2022-07-30 | End: 2022-07-31 | Stop reason: HOSPADM

## 2022-07-30 RX ADMIN — IBUPROFEN 800 MG: 800 TABLET, FILM COATED ORAL at 13:18

## 2022-07-30 RX ADMIN — LIDOCAINE HYDROCHLORIDE 20 ML: 10 INJECTION, SOLUTION EPIDURAL; INFILTRATION; INTRACAUDAL; PERINEURAL at 01:34

## 2022-07-30 RX ADMIN — OXYTOCIN 10 UNITS: 10 INJECTION, SOLUTION INTRAMUSCULAR; INTRAVENOUS at 01:23

## 2022-07-30 RX ADMIN — IBUPROFEN 800 MG: 800 TABLET, FILM COATED ORAL at 20:33

## 2022-07-30 RX ADMIN — METHYLERGONOVINE MALEATE 200 MCG: 0.2 INJECTION, SOLUTION INTRAMUSCULAR; INTRAVENOUS at 01:47

## 2022-07-30 ASSESSMENT — ACTIVITIES OF DAILY LIVING (ADL)
ADLS_ACUITY_SCORE: 20
DIFFICULTY_COMMUNICATING: NO
DIFFICULTY_EATING/SWALLOWING: NO
DIFFICULTY_EATING/SWALLOWING: NO
VISION_MANAGEMENT: GLASSES
WALKING_OR_CLIMBING_STAIRS_DIFFICULTY: NO
WALKING_OR_CLIMBING_STAIRS_DIFFICULTY: NO
ADLS_ACUITY_SCORE: 20
TOILETING_ISSUES: NO
FALL_HISTORY_WITHIN_LAST_SIX_MONTHS: NO
VISION_MANAGEMENT: GLASSES
CHANGE_IN_FUNCTIONAL_STATUS_SINCE_ONSET_OF_CURRENT_ILLNESS/INJURY: NO
CHANGE_IN_FUNCTIONAL_STATUS_SINCE_ONSET_OF_CURRENT_ILLNESS/INJURY: NO
ADLS_ACUITY_SCORE: 20
CONCENTRATING,_REMEMBERING_OR_MAKING_DECISIONS_DIFFICULTY: NO
DOING_ERRANDS_INDEPENDENTLY_DIFFICULTY: NO
ADLS_ACUITY_SCORE: 20
DRESSING/BATHING_DIFFICULTY: NO
CONCENTRATING,_REMEMBERING_OR_MAKING_DECISIONS_DIFFICULTY: NO
ADLS_ACUITY_SCORE: 20
DRESSING/BATHING_DIFFICULTY: NO
HEARING_DIFFICULTY_OR_DEAF: NO
FALL_HISTORY_WITHIN_LAST_SIX_MONTHS: NO
ADLS_ACUITY_SCORE: 20
TOILETING_ISSUES: NO
WEAR_GLASSES_OR_BLIND: YES
ADLS_ACUITY_SCORE: 20
ADLS_ACUITY_SCORE: 20
WEAR_GLASSES_OR_BLIND: YES

## 2022-07-30 NOTE — TELEPHONE ENCOUNTER
Pt is calling.    OB Triage Call      Is patient's OB/Midwife with the [facility] or [facility]? [facility]- Proceed with triage     Reason for call:  39 wks 5 days. . Contractions started yesterday AM but have been very irregular. Worsening this evening.    Assessment:  Contractions lasting 30-45 seconds every 8-12 minutes apart when laying still. Earlier this evening they were every 6-8 min apart for an hour which increases with movement but have now gone back to every 8-12 min.  She denies LOF, vaginal bleeding. Baby is moving around well.  No urge to push, vaginal pressure.  She is unable to sleep as each contraction wakes her up.  They are still very irregular, but are becoming more painful.    Plan:  Home care for now. Monitor at home x 1 hour. Pt chooses to do this. Offered for her to go in, but she declined at this time, as contractions are still not really regular, and are just now becoming more painful over the last hour.    Patient plans to deliver at [facility]     Patient's primary OB Provider is  [facility]    Per protocol recommendations Patient to follow home care recommendations. An FYI page or consult is not needed unless patient is requesting to speak to midwife or MD, they are in labor, or it is recommended by triage protocol    Is patient's delivering hospital on divert? Does not apply due to Patient given home care instructions      39w5d    Estimated Date of Delivery: 2022        OB History    Para Term  AB Living   2 1 1 0 0 1   SAB IAB Ectopic Multiple Live Births   0 0 0 0 1      # Outcome Date GA Lbr Ubaldo/2nd Weight Sex Delivery Anes PTL Lv   2 Current            1 Term 20 40w2d / 00:13 3.125 kg (6 lb 14.2 oz) F Vag-Spont EPI N AURA      Name: PADMA RENTERIA-ESTEBAN      Apgar1: 8  Apgar5: 9       Lab Results   Component Value Date    GBS Negative 2020          Emerald Mccarty 22 11:18 PM  [facility] Nurse Advisor    Reason for  "Disposition    [1] History of prior delivery (multipara) AND [2] contractions > 10 minutes, or for < 1 hour    Additional Information    Negative: Passed out (i.e., lost consciousness, collapsed and was not responding)    Negative: Shock suspected (e.g., cold/pale/clammy skin, too weak to stand, low BP, rapid pulse)    Negative: Difficult to awaken or acting confused (e.g., disoriented, slurred speech)    Negative: [1] SEVERE abdominal pain (e.g., excruciating) AND [2] constant AND [3] present > 1 hour    Negative: Severe bleeding (e.g., continuous red blood from vagina, or large blood clots)    Negative: Umbilical cord hanging out of the vagina (shiny, white, curled appearance, \"like telephone cord\")    Negative: Uncontrollable urge to push (i.e., feels like baby is coming out now)    Negative: Can see baby    Negative: Sounds like a life-threatening emergency to the triager    Negative: Pregnant < 37 weeks (i.e., )    Negative: [1] Uncertain delivery date AND [2] possibly pregnant < 37 weeks (i.e., )    Negative: [1] First baby (primipara) AND [2] contractions < 6 minutes apart  AND [3] present 2 hours    Negative: [1] History of prior delivery (multipara) AND [2] contractions < 10 minutes apart AND [3] present 1 hour    Negative: [1] History of rapid prior delivery AND [2] contractions < 10 minutes apart    Negative: [1] Leakage of fluid from vagina AND [2] green or brown in color    Negative: [1] Leakage of fluid from vagina AND [2] leakage started > 4 hours ago    Negative: Vaginal bleeding or spotting    Negative: Baby moving less today (e.g., kick count < 5 in 1 hour or < 10 in 2 hours)    Negative: Severe headache or headache that won't go away    Negative: New blurred vision or vision changes    Negative: MODERATE-SEVERE abdominal pain    Negative: [1] MILD abdominal pain (e.g., doesn't interfere with normal activities) AND [2] constant AND [3] present > 2 hours    Negative: Fever > 100.4 F " (38.0 C)    Negative: [1] Leakage of fluid from vagina AND [2] leakage started < 4 hours ago    Negative: Patient sounds very sick or weak to the triager    Negative: [1] First baby (primipara) AND [2] contractions > 5 minutes apart, or for < 2 hours    Protocols used: PREGNANCY - LABOR-A-

## 2022-07-30 NOTE — TELEPHONE ENCOUNTER
OB Triage Call      Is patient's OB/Midwife with the [facility]? [facility]- Proceed with triage     Reason for call:  IN labor. Contractions     Assessment:  Now contractions are every 6-7 minutes and a lot more painful. Still no LOF, vaginal bleeding. Good fetal movement. Some pink discharge noticed only when she wiped.    Plan: To labor and delivery    Patient plans to deliver at [facility]     Patient's primary OB Provider is  [facility]    Per protocol recommendations Patient to be evaluated in L&D. Patient's primary OB is [facility].  Labor and delivery at [facility] ([phone]) notified of patient's pending arrival.  Report given to WOODY Valdez.    Is patient's delivering hospital on divert? No      39w6d    Estimated Date of Delivery: 2022        OB History    Para Term  AB Living   2 1 1 0 0 1   SAB IAB Ectopic Multiple Live Births   0 0 0 0 1      # Outcome Date GA Lbr Ubaldo/2nd Weight Sex Delivery Anes PTL Lv   2 Current            1 Term 20 40w2d / 00:13 3.125 kg (6 lb 14.2 oz) F Vag-Spont EPI N AURA      Name: PADMA RENTERIA-ESTEBAN      Apgar1: 8  Apgar5: 9       Lab Results   Component Value Date    GBS Negative 2020          Emerald Mccarty 22 12:45 AM  [facility] Nurse Advisor    Reason for Disposition    [1] History of prior delivery (multipara) AND [2] contractions < 10 minutes apart AND [3] present 1 hour    Additional Information    Negative: Passed out (i.e., lost consciousness, collapsed and was not responding)    Negative: Shock suspected (e.g., cold/pale/clammy skin, too weak to stand, low BP, rapid pulse)    Negative: Difficult to awaken or acting confused (e.g., disoriented, slurred speech)    Negative: [1] SEVERE abdominal pain (e.g., excruciating) AND [2] constant AND [3] present > 1 hour    Negative: Severe bleeding (e.g., continuous red blood from vagina, or large blood clots)    Negative: Umbilical cord hanging out of  "the vagina (shiny, white, curled appearance, \"like telephone cord\")    Negative: Uncontrollable urge to push (i.e., feels like baby is coming out now)    Negative: Can see baby    Negative: Sounds like a life-threatening emergency to the triager    Negative: Pregnant < 37 weeks (i.e., )    Negative: [1] Uncertain delivery date AND [2] possibly pregnant < 37 weeks (i.e., )    Negative: [1] First baby (primipara) AND [2] contractions < 6 minutes apart  AND [3] present 2 hours    Protocols used: PREGNANCY - LABOR-A-      "

## 2022-07-30 NOTE — PLAN OF CARE
Data: Emily Linares transferred to room 450 via wheelchair at 0445. Baby transferred via parent's arms.  Action: Receiving unit notified of transfer: Yes. Patient and family notified of room change. Report given to Jaja MCKAY at 0450. Belongings sent to receiving unit. Accompanied by Registered Nurse. Oriented patient to surroundings. Call light within reach. ID bands double-checked with receiving RN.  Response: Patient tolerated transfer and is stable.  Patients mobililty level scored using the bedside mobility assistance tool (BMAT). Patient is at a mobility level test number: 1. Mobility equipment used: none required. Required assist of 0 staff members. Further use of BMAT scoring not required.

## 2022-07-30 NOTE — PROVIDER NOTIFICATION
07/30/22 0121   Provider Notification   Provider Name/Title Dr. Concepcion   Method of Notification Phone   Request Evaluate - Remote   Notification Reason Patient Arrived   Pt arrived at 10 cm and is pushing. Dr. Alba in house at bedside. Dr. Alba states she can do delivery and repair, Dr Concepcion not needed at bedside.

## 2022-07-30 NOTE — H&P
There has been no significant change in Emily's general health status based upon review of the prenatal records, nursing database and exam.

## 2022-07-30 NOTE — PLAN OF CARE
"Patient presented to hospital at 0110. This writer went down to ER to get patient. On way up to unit patient had 4 contractions. First 2 she got up out of the wheelchair to make it thru the contraction. On 3 rd contraction she got up out of the wheelchair and was on hands and knees. Call placed to unit to get a room ready for patient. Upon arrival to unit patient stood up and this writer noted pink fluid on the floor and patient stated \" She is coming now.\" Got patient into room and SVE done. 10/100/+2. Dr. Alba in room and prepared to push. Patient reported having contractions all day that were around 12 minutes apart then started to get worse at 10 pm.  "

## 2022-07-30 NOTE — PLAN OF CARE
Data: Vital signs within normal limits. Postpartum checks within normal limits - see flow record. Patient eating and drinking normally. Patient able to empty bladder independently and is up ambulating. No apparent signs of infection. Patient performing self cares and is able to care for infant.  Action: Patient medicated during the shift for cramping. See MAR. Patient reassessed within 1 hour after each medication and pain was improved - patient stated she was comfortable.   Response: Positive attachment behaviors observed with infant.  present, supportive and involved in care.

## 2022-07-30 NOTE — PLAN OF CARE
Pt. transferred from L& D at 0445 via wheelchair. Pt's vitals stable. Pt. up independently in room. Pt. voided x1, no stool since 7/29. Fundus midline at 1cm below umbilicus, scant lochia with no clots. Pt. c/o mild uterine pain 3/10 & declined pain meds. Pt. breastfeeding & stated baby had a good feed shortly after delivery. Pt. tolerating food & liquids. Pt. & spouse bonding well with baby. Continue to monitor per POC.

## 2022-07-30 NOTE — L&D DELIVERY NOTE
Delivery Date: 07/30/2022    Moira is a 31-year-old G2, now P2, with an intrauterine pregnancy at 39 weeks and 6 days, who was admitted to Labor and Delivery complaining of strong regular contractions for the past few hours.  Upon admission, she experienced spontaneous rupture of membranes.  Clear fluid was noted.  She was immediately admitted and had the strong urge to push.      Examination revealed that the patient was completely dilated and the cephalic was presenting.  Within two contractions, the patient pushed the baby over a midline tear.  The anterior shoulders delivered easily, followed by the rest of body.  The baby was a female infant who was vigorous and crying upon delivery.  She had Apgars 8 at one minute and 9 at 5 minutes.  Her weight is pending at this time.  The baby was placed on the patient's abdomen.  A short umbilical cord was noted at this time.  Delayed cord clamping was observed for 1 minute.  The cord was doubly clamped and then ligated by the father of the baby.      The patient was given 10 units of IM Pitocin.  Cord bloods were obtained.  The placenta delivered within 5 minutes.  It was noted to be intact with 3 vessels.  The uterus became firm with manual massage.      Inspection revealed that the patient had sustained a periurethral tear midline, just superior to the urethral opening.  It was bleeding briskly.  The area was infiltrated with 15 mL of 1% lidocaine.  It was repaired using 3-0 chromic suture in a running locking manner.  The urethra was catheterized to ensure that it was patent.  Clear urine was obtained.  The uterus was noted to be full of clots, which were manually removed.  The patient was given Methergine 0.2 mg IM.  The QBL is 305 mL.    Both the patient and the baby were doing well post-repair.  Of note, the patient had an uncomplicated pregnancy except for COVID infection in January of 2022.    Bessie Alba MD        D: 07/30/2022   T: 07/30/2022   MT:  [id number]    Name:     ESTEBAN RENTERIA  MRN:      [id number]        Account:       [id number]   :      1990           Delivery Date: 2022     Document: [id number]    cc:  MD Valentina Oakes DO

## 2022-07-30 NOTE — PROGRESS NOTES
female infant    placenta intact  Ana-urethral tear bleeding briskly repaired ( straight cathed due to close approximity of laceration= urethra patent)   dJP=510ha

## 2022-07-31 VITALS
TEMPERATURE: 98.4 F | BODY MASS INDEX: 29.07 KG/M2 | HEIGHT: 61 IN | RESPIRATION RATE: 18 BRPM | SYSTOLIC BLOOD PRESSURE: 120 MMHG | DIASTOLIC BLOOD PRESSURE: 63 MMHG | HEART RATE: 72 BPM | WEIGHT: 154 LBS

## 2022-07-31 PROCEDURE — 250N000013 HC RX MED GY IP 250 OP 250 PS 637: Performed by: OBSTETRICS & GYNECOLOGY

## 2022-07-31 RX ADMIN — IBUPROFEN 800 MG: 800 TABLET, FILM COATED ORAL at 09:32

## 2022-07-31 RX ADMIN — DOCUSATE SODIUM 100 MG: 100 CAPSULE, LIQUID FILLED ORAL at 09:32

## 2022-07-31 ASSESSMENT — ACTIVITIES OF DAILY LIVING (ADL)
ADLS_ACUITY_SCORE: 20

## 2022-07-31 NOTE — DISCHARGE INSTRUCTIONS
Postpartum Vaginal Delivery Instructions    FOLLOW UP WITH YOUR OB PHYSICIAN AT 6 WEEKS POSTPARTUM.     Activity     Ask family and friends for help when you need it.  Do not place anything in your vagina for 6 weeks.  You are not restricted on other activities, but take it easy for a few weeks to allow your body to recover from delivery.  You are able to do any activities you feel up to that point.  No driving until you have stopped taking your pain medications (usually two weeks after delivery).     Call your health care provider if you have any of these symptoms:     Increased pain, swelling, redness, or fluid around your stiches from an episiotomy or perineal tear.  A fever above 100.4 F (38 C) with or without chills when placing a thermometer under your tongue.  You soak a sanitary pad with blood within 1 hour, or you see blood clots larger than a golf ball.  Bleeding that lasts more than 6 weeks.  Vaginal discharge that smells bad.  Severe pain, cramping or tenderness in your lower belly area.  A need to urinate more frequently (use the toilet more often), more urgently (use the toilet very quickly), or it burns when you urinate.  Nausea and vomiting.  Redness, swelling or pain around a vein in your leg.  Problems breastfeeding or a red or painful area on your breast.  Chest pain and cough or are gasping for air.  Problems coping with sadness, anxiety, or depression.  If you have any concerns about hurting yourself or the baby, call your provider immediately.   You have questions or concerns after you return home.     Keep your hands clean:  Always wash your hands before touching your perineal area and stitches.  This helps reduce your risk of infection.  If your hands aren't dirty, you may use an alcohol hand-rub to clean your hands. Keep your nails clean and short.

## 2022-07-31 NOTE — DISCHARGE SUMMARY
DISCHARGE SUMMARY    Emily Linares  1990      Admission date:  2022  Discharge date: 2022    Admission diagnosis:   - Pregnancy at 39w6d  - Active labor    Discharge diagnosis:   - Status post precipitous vaginal delivery    Reason for Admission:   This is a 31 year old  39w6d who presented to Labor and Delivery and was admitted for labor.  The patient's pregnancy had been uncomplicated.     Hospital Course:   The patient was admitted and underwent Vaginal delivery precipitously very shortly after arrival to the labor unit. Baby's hospital course was uncomplicated.  The patient's postpartum course was uncomplicated. The patient is breast feeding.  She is meeting discharge criteria and desires to be discharged home today, PPD# 1        .    Discharge Exam:    Vitals:    22 1415 22 0016 22 0919   BP: 114/49 113/60 110/56 120/63   BP Location: Left arm Left arm Left arm Left arm   Patient Position: Semi-Michel's Semi-Michel's     Cuff Size: Adult Regular Adult Regular     Pulse: 72 65 70 72   Resp: 18 18 18 18   Temp: 99.1  F (37.3  C) 98.2  F (36.8  C) 99.1  F (37.3  C) 98.4  F (36.9  C)   TempSrc: Oral Oral Oral Oral   Weight:       Height:           Constitutional: healthy, alert and no distress    Abdomen:  Uterine fundus is firm, non-tender and at the level of the umbilicus   LE:  no edema, non-tender      LABS:  Hemoglobin   Date Value Ref Range Status   2022 14.5 11.7 - 15.7 g/dL Final   2022 12.6 11.7 - 15.7 g/dL Final   2020 12.6 11.7 - 15.7 g/dL Final   2020 13.4 11.7 - 15.7 g/dL Final     No results found for: RUBELLAABIGG   Lab Results   Component Value Date    ABO O 2020           Lab Results   Component Value Date    RH Pos 2020                Discharge Medications:       Review of your medicines      CONTINUE these medicines which have NOT CHANGED      Dose / Directions   prenatal multivitamin w/iron 27-0.8 MG  tablet      Dose: 1 tablet  Take 1 tablet by mouth daily  Refills: 0     valACYclovir 1000 mg tablet  Commonly known as: VALTREX  Used for: Herpes labialis      TAKE 2 TABLETS(2000 MG) BY MOUTH TWICE DAILY FOR 1 DAY  Quantity: 20 tablet  Refills: 3            Patient Instructions:  Activity: the patient was instructed to have pelvic rest for 6 weeks.  Nothing in the vagina. No tampons, douching, or intercourse.  No driving while on narcotics.  If delivered by , no heavy lifting, pushing, or pulling greater than 15 lbs for 6 weeks. She should notify her physician with temperature greater than 100.4 degrees, and if she is having any brisk vaginal bleeding where she soaks through greater than 1 pad per hour for more than 2 hours, if any area on her breast becomes red or painful, or if her pain is no longer controlled by pain medications.  Diet as tolerated.  Discussed symptoms of post-partum blues and depression and urged her to contact us immediately should that become a concern.    If any history of hypertension in the hospital, please either check your blood pressure at home, or make an appointment for a follow up nurse blood pressure check in clinic within 5-7 days of discharge. If any severe headache, upper abdominal pain, or significant visual changes with headache, please call the clinic.    Follow-up with primary OB in 6 weeks for a postpartum visit.    Vanessa Downey MD  2022

## 2022-07-31 NOTE — PLAN OF CARE
Pt stable for discharge to home.  Discharge education and follow up reviewed with patient.  Questions/concerns addressed.  ID bands matched with infant.  Pt ambulated off unit at time of discharge with family, escorted by unit staff member.  Discharged to home.

## 2022-07-31 NOTE — PLAN OF CARE
Pt's vitals stable. Pt. up independently, emptying bladder without issues, no stools. Uterine pain managed well with prn Ibuprofen. Pt. bonding well with baby & attentive to baby's needs. Spouse at bedside & supportive of pt. & baby.

## 2022-08-02 ENCOUNTER — MEDICAL CORRESPONDENCE (OUTPATIENT)
Dept: HEALTH INFORMATION MANAGEMENT | Facility: CLINIC | Age: 32
End: 2022-08-02

## 2022-08-12 NOTE — RESULT ENCOUNTER NOTE
- US on 22 shows breech/breech presentation with normal growth  - If labors, would recommend  due to presentation   Please advise the patient of the elevated abnormal 1 hour blood sugar screen and schedule her for a 3-hour OGTT  Thank you

## 2023-01-24 DIAGNOSIS — B00.1 HERPES LABIALIS: ICD-10-CM

## 2023-01-24 RX ORDER — VALACYCLOVIR HYDROCHLORIDE 1 G/1
TABLET, FILM COATED ORAL
Qty: 30 TABLET | Refills: 0 | Status: ON HOLD | OUTPATIENT
Start: 2023-01-24 | End: 2024-07-09

## 2023-01-24 NOTE — TELEPHONE ENCOUNTER
valacyclovir      Last Written Prescription Date:  1/4/23  Last Fill Quantity: 30,   # refills: 1  Last Office Visit: 7/25/22  Future Office visit:

## 2023-04-23 ENCOUNTER — HEALTH MAINTENANCE LETTER (OUTPATIENT)
Age: 33
End: 2023-04-23

## 2023-12-07 LAB
ABO (EXTERNAL): NORMAL
HEMOGLOBIN (EXTERNAL): 13.3 G/DL (ref 11.7–15.5)
HEPATITIS B SURFACE ANTIGEN (EXTERNAL): NONREACTIVE
HIV1+2 AB SERPL QL IA: NONREACTIVE
PLATELET COUNT (EXTERNAL): 346 10E3/UL (ref 140–400)
RH (EXTERNAL): POSITIVE
RUBELLA ANTIBODY IGG (EXTERNAL): NORMAL
TREPONEMA PALLIDUM ANTIBODY (EXTERNAL): NONREACTIVE

## 2024-06-15 LAB — GROUP B STREPTOCOCCUS (EXTERNAL): NEGATIVE

## 2024-06-30 ENCOUNTER — HEALTH MAINTENANCE LETTER (OUTPATIENT)
Age: 34
End: 2024-06-30

## 2024-07-07 ENCOUNTER — HOSPITAL ENCOUNTER (INPATIENT)
Facility: HOSPITAL | Age: 34
LOS: 1 days | Discharge: HOME | End: 2024-07-09
Attending: ADVANCED PRACTICE MIDWIFE | Admitting: REGISTERED NURSE
Payer: COMMERCIAL

## 2024-07-07 PROBLEM — Z36.89 ENCOUNTER FOR TRIAGE IN PREGNANT PATIENT: Status: ACTIVE | Noted: 2024-07-07

## 2024-07-07 RX ORDER — LIDOCAINE 40 MG/G
CREAM TOPICAL
Status: DISCONTINUED | OUTPATIENT
Start: 2024-07-07 | End: 2024-07-08 | Stop reason: HOSPADM

## 2024-07-07 ASSESSMENT — ACTIVITIES OF DAILY LIVING (ADL): ADLS_ACUITY_SCORE: 35

## 2024-07-08 PROBLEM — Z34.90 TERM PREGNANCY: Status: ACTIVE | Noted: 2024-07-08

## 2024-07-08 LAB
ABO/RH(D): NORMAL
ANTIBODY SCREEN: NEGATIVE
HGB BLD-MCNC: 13.2 G/DL (ref 11.7–15.7)
SPECIMEN EXPIRATION DATE: NORMAL
T PALLIDUM AB SER QL: NONREACTIVE

## 2024-07-08 PROCEDURE — 36415 COLL VENOUS BLD VENIPUNCTURE: CPT | Performed by: REGISTERED NURSE

## 2024-07-08 PROCEDURE — 85018 HEMOGLOBIN: CPT | Performed by: REGISTERED NURSE

## 2024-07-08 PROCEDURE — 250N000011 HC RX IP 250 OP 636: Performed by: REGISTERED NURSE

## 2024-07-08 PROCEDURE — 86900 BLOOD TYPING SEROLOGIC ABO: CPT | Performed by: REGISTERED NURSE

## 2024-07-08 PROCEDURE — 258N000003 HC RX IP 258 OP 636: Performed by: REGISTERED NURSE

## 2024-07-08 PROCEDURE — 722N000001 HC LABOR CARE VAGINAL DELIVERY SINGLE

## 2024-07-08 PROCEDURE — 250N000013 HC RX MED GY IP 250 OP 250 PS 637: Performed by: REGISTERED NURSE

## 2024-07-08 PROCEDURE — 250N000009 HC RX 250: Performed by: REGISTERED NURSE

## 2024-07-08 PROCEDURE — 120N000001 HC R&B MED SURG/OB

## 2024-07-08 PROCEDURE — 86780 TREPONEMA PALLIDUM: CPT | Performed by: REGISTERED NURSE

## 2024-07-08 RX ORDER — NALOXONE HYDROCHLORIDE 0.4 MG/ML
0.4 INJECTION, SOLUTION INTRAMUSCULAR; INTRAVENOUS; SUBCUTANEOUS
Status: DISCONTINUED | OUTPATIENT
Start: 2024-07-08 | End: 2024-07-08 | Stop reason: HOSPADM

## 2024-07-08 RX ORDER — OXYTOCIN/0.9 % SODIUM CHLORIDE 30/500 ML
340 PLASTIC BAG, INJECTION (ML) INTRAVENOUS CONTINUOUS PRN
Status: DISCONTINUED | OUTPATIENT
Start: 2024-07-08 | End: 2024-07-08 | Stop reason: HOSPADM

## 2024-07-08 RX ORDER — TRANEXAMIC ACID 10 MG/ML
1 INJECTION, SOLUTION INTRAVENOUS EVERY 30 MIN PRN
Status: DISCONTINUED | OUTPATIENT
Start: 2024-07-08 | End: 2024-07-08 | Stop reason: HOSPADM

## 2024-07-08 RX ORDER — NALOXONE HYDROCHLORIDE 0.4 MG/ML
0.4 INJECTION, SOLUTION INTRAMUSCULAR; INTRAVENOUS; SUBCUTANEOUS
Status: DISCONTINUED | OUTPATIENT
Start: 2024-07-08 | End: 2024-07-09 | Stop reason: HOSPADM

## 2024-07-08 RX ORDER — OXYTOCIN 10 [USP'U]/ML
10 INJECTION, SOLUTION INTRAMUSCULAR; INTRAVENOUS
Status: DISCONTINUED | OUTPATIENT
Start: 2024-07-08 | End: 2024-07-08 | Stop reason: HOSPADM

## 2024-07-08 RX ORDER — HYDROCORTISONE 25 MG/G
CREAM TOPICAL 3 TIMES DAILY PRN
Status: DISCONTINUED | OUTPATIENT
Start: 2024-07-08 | End: 2024-07-09 | Stop reason: HOSPADM

## 2024-07-08 RX ORDER — IBUPROFEN 800 MG/1
800 TABLET, FILM COATED ORAL EVERY 6 HOURS PRN
Status: DISCONTINUED | OUTPATIENT
Start: 2024-07-08 | End: 2024-07-09 | Stop reason: HOSPADM

## 2024-07-08 RX ORDER — OXYTOCIN 10 [USP'U]/ML
10 INJECTION, SOLUTION INTRAMUSCULAR; INTRAVENOUS
Status: DISCONTINUED | OUTPATIENT
Start: 2024-07-08 | End: 2024-07-09 | Stop reason: HOSPADM

## 2024-07-08 RX ORDER — PROCHLORPERAZINE 25 MG
25 SUPPOSITORY, RECTAL RECTAL EVERY 12 HOURS PRN
Status: DISCONTINUED | OUTPATIENT
Start: 2024-07-08 | End: 2024-07-08 | Stop reason: HOSPADM

## 2024-07-08 RX ORDER — CITRIC ACID/SODIUM CITRATE 334-500MG
30 SOLUTION, ORAL ORAL
Status: DISCONTINUED | OUTPATIENT
Start: 2024-07-08 | End: 2024-07-08 | Stop reason: HOSPADM

## 2024-07-08 RX ORDER — NALOXONE HYDROCHLORIDE 0.4 MG/ML
0.2 INJECTION, SOLUTION INTRAMUSCULAR; INTRAVENOUS; SUBCUTANEOUS
Status: DISCONTINUED | OUTPATIENT
Start: 2024-07-08 | End: 2024-07-09 | Stop reason: HOSPADM

## 2024-07-08 RX ORDER — ACETAMINOPHEN 325 MG/1
650 TABLET ORAL EVERY 4 HOURS PRN
Status: DISCONTINUED | OUTPATIENT
Start: 2024-07-08 | End: 2024-07-09 | Stop reason: HOSPADM

## 2024-07-08 RX ORDER — ONDANSETRON 4 MG/1
4 TABLET, ORALLY DISINTEGRATING ORAL EVERY 6 HOURS PRN
Status: DISCONTINUED | OUTPATIENT
Start: 2024-07-08 | End: 2024-07-08 | Stop reason: HOSPADM

## 2024-07-08 RX ORDER — CARBOPROST TROMETHAMINE 250 UG/ML
250 INJECTION, SOLUTION INTRAMUSCULAR
Status: DISCONTINUED | OUTPATIENT
Start: 2024-07-08 | End: 2024-07-08 | Stop reason: HOSPADM

## 2024-07-08 RX ORDER — METOCLOPRAMIDE HYDROCHLORIDE 5 MG/ML
10 INJECTION INTRAMUSCULAR; INTRAVENOUS EVERY 6 HOURS PRN
Status: DISCONTINUED | OUTPATIENT
Start: 2024-07-08 | End: 2024-07-08 | Stop reason: HOSPADM

## 2024-07-08 RX ORDER — PROCHLORPERAZINE MALEATE 10 MG
10 TABLET ORAL EVERY 6 HOURS PRN
Status: DISCONTINUED | OUTPATIENT
Start: 2024-07-08 | End: 2024-07-08 | Stop reason: HOSPADM

## 2024-07-08 RX ORDER — LOPERAMIDE HCL 2 MG
2 CAPSULE ORAL
Status: DISCONTINUED | OUTPATIENT
Start: 2024-07-08 | End: 2024-07-08 | Stop reason: HOSPADM

## 2024-07-08 RX ORDER — CARBOPROST TROMETHAMINE 250 UG/ML
250 INJECTION, SOLUTION INTRAMUSCULAR
Status: DISCONTINUED | OUTPATIENT
Start: 2024-07-08 | End: 2024-07-09 | Stop reason: HOSPADM

## 2024-07-08 RX ORDER — LOPERAMIDE HCL 2 MG
2 CAPSULE ORAL
Status: DISCONTINUED | OUTPATIENT
Start: 2024-07-08 | End: 2024-07-09 | Stop reason: HOSPADM

## 2024-07-08 RX ORDER — NALOXONE HYDROCHLORIDE 0.4 MG/ML
0.2 INJECTION, SOLUTION INTRAMUSCULAR; INTRAVENOUS; SUBCUTANEOUS
Status: DISCONTINUED | OUTPATIENT
Start: 2024-07-08 | End: 2024-07-08 | Stop reason: HOSPADM

## 2024-07-08 RX ORDER — MISOPROSTOL 200 UG/1
800 TABLET ORAL
Status: DISCONTINUED | OUTPATIENT
Start: 2024-07-08 | End: 2024-07-09 | Stop reason: HOSPADM

## 2024-07-08 RX ORDER — METHYLERGONOVINE MALEATE 0.2 MG/ML
200 INJECTION INTRAVENOUS
Status: DISCONTINUED | OUTPATIENT
Start: 2024-07-08 | End: 2024-07-08 | Stop reason: HOSPADM

## 2024-07-08 RX ORDER — MISOPROSTOL 200 UG/1
400 TABLET ORAL
Status: DISCONTINUED | OUTPATIENT
Start: 2024-07-08 | End: 2024-07-09 | Stop reason: HOSPADM

## 2024-07-08 RX ORDER — DOCUSATE SODIUM 100 MG/1
100 CAPSULE, LIQUID FILLED ORAL DAILY
Status: DISCONTINUED | OUTPATIENT
Start: 2024-07-08 | End: 2024-07-09 | Stop reason: HOSPADM

## 2024-07-08 RX ORDER — OXYTOCIN/0.9 % SODIUM CHLORIDE 30/500 ML
340 PLASTIC BAG, INJECTION (ML) INTRAVENOUS CONTINUOUS PRN
Status: DISCONTINUED | OUTPATIENT
Start: 2024-07-08 | End: 2024-07-09 | Stop reason: HOSPADM

## 2024-07-08 RX ORDER — BISACODYL 10 MG
10 SUPPOSITORY, RECTAL RECTAL DAILY PRN
Status: DISCONTINUED | OUTPATIENT
Start: 2024-07-08 | End: 2024-07-09 | Stop reason: HOSPADM

## 2024-07-08 RX ORDER — MODIFIED LANOLIN
OINTMENT (GRAM) TOPICAL
Status: DISCONTINUED | OUTPATIENT
Start: 2024-07-08 | End: 2024-07-09 | Stop reason: HOSPADM

## 2024-07-08 RX ORDER — METOCLOPRAMIDE 10 MG/1
10 TABLET ORAL EVERY 6 HOURS PRN
Status: DISCONTINUED | OUTPATIENT
Start: 2024-07-08 | End: 2024-07-08 | Stop reason: HOSPADM

## 2024-07-08 RX ORDER — LOPERAMIDE HCL 2 MG
4 CAPSULE ORAL
Status: DISCONTINUED | OUTPATIENT
Start: 2024-07-08 | End: 2024-07-08 | Stop reason: HOSPADM

## 2024-07-08 RX ORDER — OXYCODONE HYDROCHLORIDE 5 MG/1
5 TABLET ORAL EVERY 4 HOURS PRN
Status: DISCONTINUED | OUTPATIENT
Start: 2024-07-08 | End: 2024-07-09 | Stop reason: HOSPADM

## 2024-07-08 RX ORDER — TRANEXAMIC ACID 10 MG/ML
1 INJECTION, SOLUTION INTRAVENOUS EVERY 30 MIN PRN
Status: DISCONTINUED | OUTPATIENT
Start: 2024-07-08 | End: 2024-07-09 | Stop reason: HOSPADM

## 2024-07-08 RX ORDER — KETOROLAC TROMETHAMINE 30 MG/ML
30 INJECTION, SOLUTION INTRAMUSCULAR; INTRAVENOUS
Status: COMPLETED | OUTPATIENT
Start: 2024-07-08 | End: 2024-07-08

## 2024-07-08 RX ORDER — MISOPROSTOL 200 UG/1
400 TABLET ORAL
Status: DISCONTINUED | OUTPATIENT
Start: 2024-07-08 | End: 2024-07-08 | Stop reason: HOSPADM

## 2024-07-08 RX ORDER — FENTANYL CITRATE 50 UG/ML
100 INJECTION, SOLUTION INTRAMUSCULAR; INTRAVENOUS
Status: DISCONTINUED | OUTPATIENT
Start: 2024-07-08 | End: 2024-07-08 | Stop reason: HOSPADM

## 2024-07-08 RX ORDER — LOPERAMIDE HCL 2 MG
4 CAPSULE ORAL
Status: DISCONTINUED | OUTPATIENT
Start: 2024-07-08 | End: 2024-07-09 | Stop reason: HOSPADM

## 2024-07-08 RX ORDER — IBUPROFEN 800 MG/1
800 TABLET, FILM COATED ORAL
Status: COMPLETED | OUTPATIENT
Start: 2024-07-08 | End: 2024-07-08

## 2024-07-08 RX ORDER — MISOPROSTOL 200 UG/1
800 TABLET ORAL
Status: DISCONTINUED | OUTPATIENT
Start: 2024-07-08 | End: 2024-07-08 | Stop reason: HOSPADM

## 2024-07-08 RX ORDER — OXYTOCIN/0.9 % SODIUM CHLORIDE 30/500 ML
100-340 PLASTIC BAG, INJECTION (ML) INTRAVENOUS CONTINUOUS PRN
Status: DISCONTINUED | OUTPATIENT
Start: 2024-07-08 | End: 2024-07-09 | Stop reason: HOSPADM

## 2024-07-08 RX ORDER — METHYLERGONOVINE MALEATE 0.2 MG/ML
200 INJECTION INTRAVENOUS
Status: DISCONTINUED | OUTPATIENT
Start: 2024-07-08 | End: 2024-07-09 | Stop reason: HOSPADM

## 2024-07-08 RX ORDER — ONDANSETRON 2 MG/ML
4 INJECTION INTRAMUSCULAR; INTRAVENOUS EVERY 6 HOURS PRN
Status: DISCONTINUED | OUTPATIENT
Start: 2024-07-08 | End: 2024-07-08 | Stop reason: HOSPADM

## 2024-07-08 RX ADMIN — IBUPROFEN 800 MG: 800 TABLET, FILM COATED ORAL at 15:58

## 2024-07-08 RX ADMIN — KETOROLAC TROMETHAMINE 30 MG: 30 INJECTION, SOLUTION INTRAMUSCULAR at 03:23

## 2024-07-08 RX ADMIN — WITCH HAZEL: 500 SOLUTION RECTAL; TOPICAL at 05:47

## 2024-07-08 RX ADMIN — SODIUM CHLORIDE, POTASSIUM CHLORIDE, SODIUM LACTATE AND CALCIUM CHLORIDE 500 ML: 600; 310; 30; 20 INJECTION, SOLUTION INTRAVENOUS at 01:27

## 2024-07-08 RX ADMIN — DOCUSATE SODIUM 100 MG: 100 CAPSULE, LIQUID FILLED ORAL at 08:44

## 2024-07-08 RX ADMIN — ACETAMINOPHEN 650 MG: 325 TABLET ORAL at 08:44

## 2024-07-08 RX ADMIN — BENZOCAINE AND LEVOMENTHOL: 200; 5 SPRAY TOPICAL at 05:47

## 2024-07-08 RX ADMIN — Medication 340 ML/HR: at 02:57

## 2024-07-08 RX ADMIN — ACETAMINOPHEN 650 MG: 325 TABLET ORAL at 20:49

## 2024-07-08 ASSESSMENT — ACTIVITIES OF DAILY LIVING (ADL)
ADLS_ACUITY_SCORE: 20
ADLS_ACUITY_SCORE: 25
ADLS_ACUITY_SCORE: 20
ADLS_ACUITY_SCORE: 20
ADLS_ACUITY_SCORE: 25
ADLS_ACUITY_SCORE: 20
ADLS_ACUITY_SCORE: 21
ADLS_ACUITY_SCORE: 21
ADLS_ACUITY_SCORE: 20
ADLS_ACUITY_SCORE: 25
ADLS_ACUITY_SCORE: 20

## 2024-07-08 NOTE — PROGRESS NOTES
7/7/2024 2300 Katie presents to labor and delivery states contractions have been happening all day but started to get more regular and more painful around 2100.  Katie denies LOF, states positive fetal movement, small amount of bloody show.  Emily plans an unmedicated birth and denies complications during the pregnancy.    2305 EFM placed and triage assessment done.

## 2024-07-08 NOTE — LACTATION NOTE
This note was copied from a baby's chart.  Lactation Visit:      Hours since Delivery: 6 hours old.    Gestational Age at Delivery: 40.3 weeks.    Per bedside RN, mother prefers to wait until tomorrow to have a visit with lactation. LC will be available upon request today, and will round tomorrow.     Leti Joiner RN BSN, IBCLC

## 2024-07-08 NOTE — PROGRESS NOTES
D:  Emily and infant were admitted to [facility]6/-01 via wheelchair with  and support person Milton.  A:  Bedside report received from Thania SHIELDS RN. Oriented patient and family to surroundings; call light within reach. 4 Part ID bands double checked with reporting RN.  R:  Patient and  tolerated transfer. Care assumed.

## 2024-07-08 NOTE — L&D DELIVERY NOTE
Vaginal Delivery Note    Name: Emily Linares  : 1990  MRN: [id number]    PRE DELIVERY DIAGNOSIS  33 year old  3 Para  at 40w3d      1) Marginal cord insertion  2) Suspected LGA (AC 97%)  3) History of precipitous birth x 2    POST DELIVERY DIAGNOSIS  33 year old  @ 40w3d  Delivery of a viable infant weight pending   via     YOB: 2023     Birth Time: 2:56 AM       Augmentation No              Indication:   Induction No                      Indication:     Monitors: External     GBS: Negative    ROM: SROM  Fluid Type: Bloody    Labor Analgesia/Anesthesia:Non-pharmacologic    Cord pH obtained: No  Placenta Date/Time: 2024  3:01 AM   Placenta submitted to Pathology: No    Description of procedure:   33 year old  with PNC w/ MWC and pregnancy complicated by  none  presented to L&D with labor contractions.  Her hospital course was complicated by fetal heart rate abnormalities.  Patient progressed to complete with spontaneous rupture of membranes. Arrived at 5cm around 2330, had recurrent variable decelerations following intrauterine rescucitation measures, moderate variability throughout. Consented to AROM at 0100. Quickly entered transition. Felt spontaneous urge to push just before 0200. Struggled through majority of second stage to find pushing efforts. Did not consent to any internal exams/directed pushing or FSE placement on multiple requests. The  team was called to attend delivery secondary to category 2 FHR. After a significant verbal input of encouragement  Emily was able to start pushing and had 4 contractions of actual pushing efforts. The anterior shoulder delivered easily with gentle downward traction paired with maternal efforts. Emily Linares and Milton welcomed their son, Crew.   Shoulder Dystocia No  Operative Vaginal Delivery No  Infant spontaneously delivered over bilateral periurethrals that were well approximated, non overlapping and  hemostatic so left unrepaired.   Infant delivered in ЕКАТЕРИНА position.  Nuchal cord Yes, tight x 2   Delivered through    Placenta spontaneously delivered: 7/8/2024  3:01 AM  grossly intact with 3 vessel cord.  Infant:  Live, vigorous infant was handed to mom.    Delivery was complicated by nothing Interventions included fundal massage and pitocin.    Delivery QBL (mL): 200    Mother and Infant stable.    MARK Doll Dr. remained available for consultation and collaboration as needed.      7/8/2024 3:07 AM

## 2024-07-08 NOTE — H&P
"HISTORY AND PHYSICAL UPDATE ADMISSION EXAM    Name: Emily Linares  YOB: 1990  Medical Record Number: [id number]    History of Present Illness: Emily Linares is a 33 year old female who is 40w3d pregnant and being admitted for active labor management. Started early labor around 0430, things intensified around bedtime. Supported by her partner, Milton     Estimated Date of Delivery: 2024    EGA 40w3d    OB History    Para Term  AB Living   3 2 2 0 0 2   SAB IAB Ectopic Multiple Live Births   0 0 0 0 2      # Outcome Date GA Lbr Ubaldo/2nd Weight Sex Type Anes PTL Lv   3 Current            2 Term 22 39w6d 07:16 / 00:04 2.88 kg (6 lb 5.6 oz) F Vag-Spont Local N AURA      Name: ELIJAH LINARES      Apgar1: 8  Apgar5: 9   1 Term 20 40w2d / 00:13 3.125 kg (6 lb 14.2 oz) F Vag-Spont EPI N AURA      Name: ELIJAH LINARES      Apgar1: 8  Apgar5: 9        Lab Results   Component Value Date    ABO O 2020    RH Pos 2020    AS Negative 2022    HEPBANG Nonreactive 2021    CHPCRT Negative 2022    GCPCRT Negative 2022    TREPAB Negative 2016    HGB 14.5 2022       Prenatal Complications:   1) Marginal cord insertion  2) Suspected LGA (AC 97%)  3) History of precipitous birth x 2      Exam:      /73   Temp 98.5  F (36.9  C) (Oral)   Resp 20   Ht 1.549 m (5' 1\")   Wt 72.3 kg (159 lb 6.4 oz)   SpO2 95%   BMI 30.12 kg/m      Fetal heart Rate Category 2-baseline 145, moderate variability, accelerations few, decelerations recurrent variable   Contractions 5-6 minutes    HEENT grossly normal  Neck: no lymphadenopathy or thryoidomegaly  Lungs CTAB  Heart RRR  ABD gravid, non-tender  EXT:  No edema, moves freely  Vaginal exam /+1 per RN  Membranes: intact    Assessment: active labor management  GBS negative  O positive  Recurrent variable decelerations  Suspected macrosomia    Plan: Admit - see IP orders  Pain medication " as desired. Planning unmedicated birth.   LR bolus, position changes to optimize fetal hear rate and oxygenation. Consider AROM with placement of IUPC for possible amnioinfusion should variables continue or worsen. At time of dictation there is moderate variability and reassurance for fetal oxygenation status.   Counseled on risk of shoulder dystocia in clinic. Again reviewed possible maneuvers or interventions should this occur.  Anticipate   Active management of third stage    Prenatal record reviewed.    MARK Doll Dr. aware of patient status and remains available for consultation and collaboration as needed.    2024   12:16 AM

## 2024-07-08 NOTE — PLAN OF CARE
Emily's vital and maternal assessment WDL. Pain adequately managed with Toradol. Perineal discomfort managed with Tucks, Dermoplast and ice pads.Up with SBA, voiding without difficultly and caring for self and infant. Breastfeeding, unable to witness a latch during my care. Patient is attentive to infant needs/cues, asking appropriate questions and holds infant frequently. Positive attachment behaviors observed.     Problem: Adult Inpatient Plan of Care  Goal: Plan of Care Review  Description: The Plan of Care Review/Shift note should be completed every shift.  The Outcome Evaluation is a brief statement about your assessment that the patient is improving, declining, or no change.  This information will be displayed automatically on your shift  note.  7/8/2024 0615 by Yennifer Ferreira RN  Outcome: Progressing  7/8/2024 0615 by Yennifer Ferreira RN  Outcome: Progressing  Flowsheets (Taken 7/8/2024 0615)  Plan of Care Reviewed With:   patient   spouse  7/8/2024 0614 by Yennifer Ferreira RN  Outcome: Progressing  Flowsheets (Taken 7/8/2024 0614)  Plan of Care Reviewed With: patient     Problem: Postpartum (Vaginal Delivery)  Goal: Successful Parent Role Transition  7/8/2024 0615 by Yennifer Ferreira RN  Outcome: Progressing  7/8/2024 0615 by Yennifer Ferreira RN  Outcome: Progressing  7/8/2024 0614 by Yennifer Ferreira RN  Outcome: Progressing  Intervention: Support Parent Role Transition  Recent Flowsheet Documentation  Taken 7/8/2024 0540 by Yennifer Ferreira RN  Supportive Measures:   active listening utilized   positive reinforcement provided   self-care encouraged  Parent-Child Attachment Promotion:   caring behavior modeled   cue recognition promoted   interaction encouraged   parent/caregiver presence encouraged   participation in care promoted   positive reinforcement provided   rooming-in promoted   strengths emphasized     Problem: Postpartum (Vaginal Delivery)  Goal: Optimal Pain Control and  Function  7/8/2024 0615 by Yennifer Ferreira RN  Outcome: Progressing  7/8/2024 0615 by Yennifer Ferreira RN  Outcome: Progressing  7/8/2024 0614 by Yennifer Ferreira RN  Outcome: Progressing  Intervention: Prevent or Manage Pain  Recent Flowsheet Documentation  Taken 7/8/2024 0540 by Yennifer Ferreira RN  Pain Management Interventions:   care clustered   cold applied   rest   repositioned  Perineal Care: perineal hygiene encouraged     Problem: Postpartum (Vaginal Delivery)  Goal: Effective Urinary Elimination  7/8/2024 0615 by Yennifer Ferreira RN  Outcome: Progressing  7/8/2024 0615 by Yennifer Ferreira RN  Outcome: Progressing  7/8/2024 0614 by Yennifer Ferreira RN  Outcome: Progressing  Intervention: Monitor and Manage Urinary Retention  Recent Flowsheet Documentation  Taken 7/8/2024 0540 by Yennifer Ferreira RN  Urinary Elimination Promotion: frequent voiding encouraged   Goal Outcome Evaluation:      Plan of Care Reviewed With: patient, spouse

## 2024-07-08 NOTE — PROGRESS NOTES
2350 7/7/2024 Dottie Klein CNM called and informed of Emily's admission.  Writer informs CNM 5cm/90%/+1 station, membranes intact, vertex presentation.    CNM informed of 4 variable decelerations in the last 30 minutes with contractions q3-5 minutes.  Variable decelerations lasting between 30-70 seconds (audibly as writer stays at bedside) with nadirs of .  Moderate variability with fetal accelerations present.  Dottie will come in to see the patient.

## 2024-07-08 NOTE — PROGRESS NOTES
RN and CNM stayed at bedside throughout transition and second stage. Fetal wellbeing monitored continuously and interventions as noted. Viable male infant delivered at 0256 apgars 8 and 9.

## 2024-07-08 NOTE — PLAN OF CARE
Problem: Adult Inpatient Plan of Care  Goal: Plan of Care Review  Description: The Plan of Care Review/Shift note should be completed every shift.  The Outcome Evaluation is a brief statement about your assessment that the patient is improving, declining, or no change.  This information will be displayed automatically on your shift  note.  Outcome: Progressing  Flowsheets (Taken 7/8/2024 1250)  Plan of Care Reviewed With: patient   Goal Outcome Evaluation:      Plan of Care Reviewed With: patient           VSS  Postpartum assessment WNL:  Fundus firm at umbilicus  Lochia light  Voiding  Breastfeeding, going well.

## 2024-07-09 VITALS
HEIGHT: 61 IN | HEART RATE: 62 BPM | OXYGEN SATURATION: 95 % | DIASTOLIC BLOOD PRESSURE: 63 MMHG | WEIGHT: 146.9 LBS | SYSTOLIC BLOOD PRESSURE: 125 MMHG | RESPIRATION RATE: 18 BRPM | TEMPERATURE: 97.6 F | BODY MASS INDEX: 27.73 KG/M2

## 2024-07-09 PROBLEM — Z36.89 ENCOUNTER FOR TRIAGE IN PREGNANT PATIENT: Status: RESOLVED | Noted: 2024-07-07 | Resolved: 2024-07-09

## 2024-07-09 PROBLEM — U07.1 COVID-19 AFFECTING PREGNANCY IN SECOND TRIMESTER: Status: RESOLVED | Noted: 2022-04-07 | Resolved: 2024-07-09

## 2024-07-09 PROBLEM — Z34.90 TERM PREGNANCY: Status: RESOLVED | Noted: 2024-07-08 | Resolved: 2024-07-09

## 2024-07-09 PROBLEM — O98.512 COVID-19 AFFECTING PREGNANCY IN SECOND TRIMESTER: Status: RESOLVED | Noted: 2022-04-07 | Resolved: 2024-07-09

## 2024-07-09 PROCEDURE — 250N000013 HC RX MED GY IP 250 OP 250 PS 637: Performed by: REGISTERED NURSE

## 2024-07-09 RX ORDER — DOCUSATE SODIUM 100 MG/1
100 CAPSULE, LIQUID FILLED ORAL 2 TIMES DAILY PRN
COMMUNITY
Start: 2024-07-09

## 2024-07-09 RX ORDER — ACETAMINOPHEN 325 MG/1
650 TABLET ORAL EVERY 4 HOURS PRN
COMMUNITY
Start: 2024-07-09

## 2024-07-09 RX ORDER — IBUPROFEN 800 MG/1
800 TABLET, FILM COATED ORAL EVERY 6 HOURS PRN
COMMUNITY
Start: 2024-07-09

## 2024-07-09 RX ADMIN — DOCUSATE SODIUM 100 MG: 100 CAPSULE, LIQUID FILLED ORAL at 08:22

## 2024-07-09 RX ADMIN — IBUPROFEN 800 MG: 800 TABLET, FILM COATED ORAL at 08:22

## 2024-07-09 RX ADMIN — WITCH HAZEL 1 EACH: 500 SOLUTION RECTAL; TOPICAL at 12:21

## 2024-07-09 ASSESSMENT — ACTIVITIES OF DAILY LIVING (ADL)
ADLS_ACUITY_SCORE: 20

## 2024-07-09 NOTE — PLAN OF CARE
Patient's VSS.  Stated to have a minimal pain of 2/10 due to abdomen, tylenol PRN given. Pain relieved.  Up and ambulating independently.  Bonding well with baby through feedings, changed diapers, skin to skin contact, and holding.   Mother is breast and formula feeding baby.  Educated the importance of feeding baby every 2-3 hours.   Still needs to complete paperwork.  Parents are very attentive to infant cares and asking appropriate questions.    Problem: Breastfeeding  Goal: Effective Breastfeeding  7/9/2024 0620 by Rachel Nuno RN  Outcome: Progressing  7/8/2024 2237 by Rachel Nuno RN  Outcome: Progressing  Intervention: Promote Effective Breastfeeding  Recent Flowsheet Documentation  Taken 7/8/2024 2343 by Rachel Nuno RN  Breastfeeding Assistance: support offered  Parent-Child Attachment Promotion:   caring behavior modeled   cue recognition promoted  Taken 7/8/2024 1930 by Rachel Nuno RN  Parent-Child Attachment Promotion:   caring behavior modeled   cue recognition promoted  Intervention: Support Exclusive Breastfeeding Success  Recent Flowsheet Documentation  Taken 7/8/2024 2343 by Rachel Nuno RN  Supportive Measures: goal-setting facilitated  Breastfeeding Support:   encouragement provided   maternal hydration promoted   maternal nutrition promoted   maternal rest encouraged  Taken 7/8/2024 1930 by Rachel Nuno RN  Supportive Measures: goal-setting facilitated      Goal Outcome Evaluation:      Plan of Care Reviewed With: patient    Overall Patient Progress: improvingOverall Patient Progress: improving    Outcome Evaluation: Patient doing well. Vitals are stable. Denies having any pain.

## 2024-07-09 NOTE — PROGRESS NOTES
"Vaginal Delivery Postpartum Day 1    Patient Name:  Emily Linares  :      1990  MRN:      [id number]      Assessment:  Normal postpartum course.    Plan:  Continue current care.      Subjective:  The patient feels well:  Voiding without difficulty, lochia normal, tolerating normal diet, ambulating without difficulty and passing flatus. Voiding independently without complication. Pain is well controlled with current medications.  The patient has no emotional concerns.  The baby is well and being fed by breast.  Desires discharge today as long as baby cleared for discharge by peds.     YOB: 2023   Birth Time: 2:56 AM     Prenatal Complications include:   1) Marginal cord insertion  2) Suspected LGA (AC 97%)  3) History of precipitous birth x 2    Objective:  /63 (BP Location: Right arm)   Pulse 62   Temp 97.6  F (36.4  C) (Oral)   Resp 18   Ht 1.549 m (5' 1\")   Wt 66.6 kg (146 lb 14.4 oz)   LMP 2023   SpO2 95%   Breastfeeding Unknown   BMI 27.76 kg/m    Patient Vitals for the past 24 hrs:   BP Temp Temp src Pulse Resp SpO2 Weight   24 0826 -- -- -- -- -- -- 66.6 kg (146 lb 14.4 oz)   24 0758 125/63 97.6  F (36.4  C) Oral 62 18 95 % --   24 2343 113/66 98.5  F (36.9  C) Oral 88 18 98 % --   24 2049 108/67 98.7  F (37.1  C) Oral 83 16 98 % --   24 1557 103/57 98.6  F (37  C) Oral 67 16 98 % --   24 1134 112/62 98.7  F (37.1  C) Oral 88 18 96 % --   24 0844 120/61 98.9  F (37.2  C) Oral 88 16 94 % --       Exam: Patient A&O x 3. No acute distress, breathing unlabored.The amount and color of the lochia is appropriate for the duration of recovery. Nursing notes reviewed.     Lab Results   Component Value Date    ABO O 2020    RH Pos 2020    AS Negative 2024    HEPBANG Nonreactive 2021    CHPCRT Negative 2022    GCPCRT Negative 2022    TREPAB Negative 2016    HGB 13.2 2024       Immunization " History   Administered Date(s) Administered    HEPA 07/03/2007, 11/18/2008    HIB (PRP-T) 1990, 01/15/1992, 03/09/1992, 01/13/1993    HPV 07/03/2007, 03/25/2008, 11/08/2008    HepB 08/03/1998, 09/11/1998, 02/12/1999    Historical DTP/aP 1990, 01/15/1992, 03/09/1992, 03/22/1993, 10/17/1996    Influenza Vaccine >6 months,quad, PF 12/15/2017, 11/26/2019, 09/23/2020    MMR 01/13/1993, 08/03/1998, 07/18/2020    Meningococcal (Menomune ) 03/25/2008    OPV, trivalent, live 1991, 01/15/1992, 03/22/1993, 10/17/1996    TD,PF 7+ (Tenivac) 08/12/2003    TDAP Vaccine (Adacel) 05/04/2020    TDAP Vaccine (Boostrix) 06/23/2016    Varicella 09/01/2004, 07/03/2007       Provider:  ELVIRA Durham CNM    Date:  7/9/2024  Time:  8:36 AM

## 2024-07-09 NOTE — DISCHARGE INSTRUCTIONS
Warning Signs after Having a Baby    Keep this paper on your fridge or somewhere else where you can see it.    Call your provider if you have any of these symptoms up to 12 weeks after having your baby.    Thoughts of hurting yourself or your baby  Pain in your chest or trouble breathing  Severe headache not helped by pain medicine  Eyesight concerns (blurry vision, seeing spots or flashes of light, other changes to eyesight)  Fainting, shaking or other signs of a seizure    Call 9-1-1 if you feel that it is an emergency.     The symptoms below can happen to anyone after giving birth. They can be very serious. Call your provider if you have any of these warning signs.    My provider s phone number: _______________________    Losing too much blood (hemorrhage)    Call your provider if you soak through a pad in less than an hour or pass blood clots bigger than a golf ball. These may be signs that you are bleeding too much.    Blood clots in the legs or lungs    After you give birth, your body naturally clots its blood to help prevent blood loss. Sometimes this increased clotting can happen in other areas of the body, like the legs or lungs. This can block your blood flow and be very dangerous.     Call your provider if you:  Have a red, swollen spot on the back of your leg that is warm or painful when you touch it.   Are coughing up blood.     Infection    Call your provider if you have any of these symptoms:  Fever of 100.4 F (38 C) or higher.  Pain or redness around your stitches if you had an incision.   Any yellow, white, or green fluid coming from places where you had stitches or surgery.    Mood Problems (postpartum depression)    Many people feel sad or have mood changes after having a baby. But for some people, these mood swings are worse.     Call your provider right away if you feel so anxious or nervous that you can't care for yourself or your baby.    Preeclampsia (high blood pressure)    Even if you  didn't have high blood pressure when you were pregnant, you are at risk for the high blood pressure disease called preeclampsia. This risk can last up to 12 weeks after giving birth.     Call your provider if you have:   Pain on your right side under your rib cage  Sudden swelling in the hands and face    Remember: You know your body. If something doesn't feel right, get medical help.     For informational purposes only. Not to replace the advice of your health care provider. Copyright 2020 [facility]. All rights reserved. Clinically reviewed by Ankita Cornell, RNC-OB, MSN. [employer] 631240 - Rev 02/23.

## 2024-07-09 NOTE — DISCHARGE SUMMARY
OB Discharge Summary      Date:  2024    Name:  Emily Linares  :  1990  MRN:  [id number]      Admission Date:  2024  Delivery Date: 2024   Gestational Age at Delivery:  40w3d  Discharge Date:  2024    Principal Diagnosis:    Patient Active Problem List   Diagnosis    CARDIOVASCULAR SCREENING; LDL GOAL LESS THAN 160    Leukocytosis     (normal spontaneous vaginal delivery)         Conditions complicating Pregnancy:   1) Marginal cord insertion  2) Suspected LGA (AC 97%)  3) History of precipitous birth x 2    Procedure(s) Performed:      Indication for :  N/A  Indication for Induction:  N/A     Condition at Discharge:  well    Discharge Medications:      Review of your medicines        START taking        Dose / Directions   acetaminophen 325 MG tablet  Commonly known as: TYLENOL      Dose: 650 mg  Take 2 tablets (650 mg) by mouth every 4 hours as needed for mild pain  Refills: 0     docusate sodium 100 MG capsule  Commonly known as: COLACE      Dose: 100 mg  Take 1 capsule (100 mg) by mouth 2 times daily as needed for constipation  Refills: 0     ibuprofen 800 MG tablet  Commonly known as: ADVIL/MOTRIN      Dose: 800 mg  Take 1 tablet (800 mg) by mouth every 6 hours as needed for other (cramping)  Refills: 0            CONTINUE these medicines which have NOT CHANGED        Dose / Directions   breast pump Misc  Used for: Postpartum care and examination of lactating mother      Dose: 1 each  1 each daily  Quantity: 1 each  Refills: 0     IRON-FOLIC ACID PO      Dose: 1 tablet  Take 1 tablet by mouth two times daily  Refills: 0     MAGNESIUM GLYCINATE PO      Dose: 120 g/day  Take 120 g/day by mouth daily  Refills: 0     prenatal multivitamin w/iron 27-0.8 MG tablet      Dose: 1 tablet  Take 1 tablet by mouth daily  Refills: 0            STOP taking      valACYclovir 1000 mg tablet  Commonly known as: VALTREX                  Where to get your medicines        Some of these  will need a paper prescription and others can be bought over the counter. Ask your nurse if you have questions.    You don't need a prescription for these medications  acetaminophen 325 MG tablet  docusate sodium 100 MG capsule  ibuprofen 800 MG tablet          Discharge Plan:    Follow up with /MARK:  6 weeks postpartum, sooner with concerns   Patient Instructions:      Physical activity: As tolerated. Nothing in the vagina for 6 weeks.    Diet:  Regular    Medication: As listed above. May alternate ibuprofen and acetaminophen for pain management.    Other:        Physician/CNM: ELVIRA Durham CNM    Name:  Emily Linares  :  1990  MRN:  [id number]

## 2024-07-09 NOTE — PLAN OF CARE
"  Problem: Adult Inpatient Plan of Care  Goal: Plan of Care Review  Description: The Plan of Care Review/Shift note should be completed every shift.  The Outcome Evaluation is a brief statement about your assessment that the patient is improving, declining, or no change.  This information will be displayed automatically on your shift  note.  Outcome: Adequate for Care Transition  Flowsheets (Taken 7/9/2024 0926)  Plan of Care Reviewed With: patient  Overall Patient Progress: improving  Goal: Patient-Specific Goal (Individualized)  Description: You can add care plan individualizations to a care plan. Examples of Individualization might be:  \"Parent requests to be called daily at 9am for status\", \"I have a hard time hearing out of my right ear\", or \"Do not touch me to wake me up as it startles  me\".  Outcome: Adequate for Care Transition  Goal: Absence of Hospital-Acquired Illness or Injury  Outcome: Adequate for Care Transition  Intervention: Prevent Skin Injury  Recent Flowsheet Documentation  Taken 7/9/2024 0830 by Tara Antunez RN  Body Position: position changed independently  Intervention: Prevent Infection  Recent Flowsheet Documentation  Taken 7/9/2024 0830 by Tara Antunez RN  Infection Prevention: rest/sleep promoted  Goal: Optimal Comfort and Wellbeing  Outcome: Adequate for Care Transition  Intervention: Provide Person-Centered Care  Recent Flowsheet Documentation  Taken 7/9/2024 0830 by Tara Antunez RN  Trust Relationship/Rapport:   care explained   choices provided   emotional support provided   questions encouraged   questions answered   reassurance provided   thoughts/feelings acknowledged  Goal: Readiness for Transition of Care  Outcome: Adequate for Care Transition   Goal Outcome Evaluation:      Plan of Care Reviewed With: patient    Overall Patient Progress: improvingOverall Patient Progress: improving     Patient feels well and is cleared for discharge home today.  Her fundus is firm " and lochia is light.  She denies signs of preeclampsia.  Her vitals are stable and she is moving around independently.  She is breast and formula feeding her baby.  Her uterine cramping was rated as a 4/10 and she is taking prn Ibuprofen for pain.

## 2024-07-09 NOTE — DISCHARGE SUMMARY
D:  Patient desires discharge home.  Discharge orders received and entered by provider.  A:  Discharge instructions reviewed with the patient.  All questions and concerns addressed.  R:  Discharge criteria met.  4 Part ID bands double checked.   discharged in car seat with parents.  The nurse escorted patient to hospital lobby .

## 2024-07-09 NOTE — LACTATION NOTE
"This note was copied from a baby's chart.  Lactation Visit:      Hours since Delivery: 1 day 7 hours old.    Gestational Age at Delivery: 40.3 weeks.    Visit with Lactation: Mother is a multip who delivered yesterday AM; she states she breast fed her previous children for 4-14 months without difficulty. In the last 24 hours, Crew has been to breast 7 times, has received 3.-5mL of formula via bottle per feeding, has voided x3, stooled x1, and had a weight loss of 1.91% (since delivery). Mother states Crew has been obtaining a deep, comfortable latch at breast. Crew breastfeeding on cradle hold on right breast during visit, Crew doing non nutritive sucking; with permission, mother was shown how to obtain a deeper latch with Crew. Infant able to latch on deeply, and other stated it felt like a stronger tug after slight adjustment. Reviewed benefits of cross-cradle hold. Discussed  feeding behaviors during first few days of life, waking techniques, hunger cues, hand expression, paced bottle feeding, the importance of tracking infants feedings and diaper output, as well as supplementation volumes and pumping if infant isn't going to breast for feedings. Education given on importance of infant positioning for a deep, comfortable latch for effective milk transfer. Anticipatory guidance given on input/output goals, normal feeding volumes in the  period, and pumping. Encouraged mother to let primary RN know if she would like lactation to return for feeding assistance or if questions arise. Mother aware of lactation resources available to her after discharging from hospital.     Plan: Skin-to-skin prior to feedings. Continue breastfeeding on-demand and/or every 2-3 hours. Track feedings and diaper output. Supplement PRN, and mother to express for \"missed\" feedings at breast.     Has Breast Pump for Home: Yes, Remigio and Jitendra.    Education given: Stages of milk production after delivery,  behaviors " during first few days of life, Breastfeeding positions, How to obtain & maintain a deep, comfortable latch, Listening & watching for swallows, How do I know if my baby is finished & getting enough, Paced bottle feeding, Importance of tracking all feedings and diaper output, Engorgement, Nutritive vs non-nutritive sucking, Pumping for missed feedings at breast, Cluster feeding, How to tell if breastfeeding is going well, Supplementation volumes during first few days of life, [place] waking techniques, How to adjust a shallow latch, and Breast pump use for home.

## 2024-07-31 ENCOUNTER — MEDICAL CORRESPONDENCE (OUTPATIENT)
Dept: HEALTH INFORMATION MANAGEMENT | Facility: CLINIC | Age: 34
End: 2024-07-31
Payer: COMMERCIAL

## 2025-07-13 ENCOUNTER — HEALTH MAINTENANCE LETTER (OUTPATIENT)
Age: 35
End: 2025-07-13